# Patient Record
Sex: MALE | Race: WHITE | NOT HISPANIC OR LATINO | URBAN - METROPOLITAN AREA
[De-identification: names, ages, dates, MRNs, and addresses within clinical notes are randomized per-mention and may not be internally consistent; named-entity substitution may affect disease eponyms.]

---

## 2019-02-24 ENCOUNTER — EMERGENCY (EMERGENCY)
Facility: HOSPITAL | Age: 64
LOS: 1 days | Discharge: HOME | End: 2019-02-24
Admitting: EMERGENCY MEDICINE
Payer: COMMERCIAL

## 2019-02-24 VITALS
HEART RATE: 62 BPM | SYSTOLIC BLOOD PRESSURE: 145 MMHG | TEMPERATURE: 99 F | DIASTOLIC BLOOD PRESSURE: 72 MMHG | OXYGEN SATURATION: 96 % | RESPIRATION RATE: 18 BRPM

## 2019-02-24 VITALS
HEART RATE: 66 BPM | SYSTOLIC BLOOD PRESSURE: 137 MMHG | TEMPERATURE: 99 F | OXYGEN SATURATION: 97 % | DIASTOLIC BLOOD PRESSURE: 71 MMHG | RESPIRATION RATE: 17 BRPM

## 2019-02-24 DIAGNOSIS — R50.9 FEVER, UNSPECIFIED: ICD-10-CM

## 2019-02-24 DIAGNOSIS — B20 HUMAN IMMUNODEFICIENCY VIRUS [HIV] DISEASE: ICD-10-CM

## 2019-02-24 DIAGNOSIS — R05 COUGH: ICD-10-CM

## 2019-02-24 DIAGNOSIS — Z88.2 ALLERGY STATUS TO SULFONAMIDES: ICD-10-CM

## 2019-02-24 PROCEDURE — 71046 X-RAY EXAM CHEST 2 VIEWS: CPT | Mod: 26

## 2019-02-24 PROCEDURE — 99283 EMERGENCY DEPT VISIT LOW MDM: CPT | Mod: 25

## 2019-02-24 RX ORDER — AZITHROMYCIN 500 MG/1
500 TABLET, FILM COATED ORAL ONCE
Qty: 0 | Refills: 0 | Status: COMPLETED | OUTPATIENT
Start: 2019-02-24 | End: 2019-02-24

## 2019-02-24 RX ORDER — AZITHROMYCIN 500 MG/1
1 TABLET, FILM COATED ORAL
Qty: 6 | Refills: 0
Start: 2019-02-24 | End: 2019-02-28

## 2019-02-24 RX ADMIN — Medication 200 MILLIGRAM(S): at 18:24

## 2019-02-24 RX ADMIN — AZITHROMYCIN 500 MILLIGRAM(S): 500 TABLET, FILM COATED ORAL at 18:23

## 2019-02-24 NOTE — ED ADULT NURSE NOTE - NSIMPLEMENTINTERV_GEN_ALL_ED
Implemented All Universal Safety Interventions:  [place] to call system. Call bell, personal items and telephone within reach. Instruct patient to call for assistance. Room bathroom lighting operational. Non-slip footwear when patient is off stretcher. Physically safe environment: no spills, clutter or unnecessary equipment. Stretcher in lowest position, wheels locked, appropriate side rails in place.

## 2019-02-24 NOTE — ED ADULT TRIAGE NOTE - CHIEF COMPLAINT QUOTE
pt c/o pt c/o productive cough. states it started Tuesday after he got home from a cruise vacation. pt is hiv positive

## 2019-02-24 NOTE — ED PROVIDER NOTE - DIAGNOSTIC INTERPRETATION
Interpreted by ERIBERTO Cespedes   Chest x-ray, 2 views  Lungs clear, heart shadow normal, bony structures normal, no free air under diaphragm, no PTX

## 2019-02-24 NOTE — ED PROVIDER NOTE - CLINICAL SUMMARY MEDICAL DECISION MAKING FREE TEXT BOX
5 y/o M with presents to the ED c/o a productive cough x 6 days. Bronchitis vs early PNA, Zpak, cough suppressant and PMD f/u. 63 y/o M with presents to the ED c/o a productive cough x 6 days. Bronchitis vs early PNA, Zpak, cough suppressant and PMD f/u.

## 2019-02-24 NOTE — ED ADULT NURSE NOTE - CHIEF COMPLAINT QUOTE
pt c/o productive cough. states it started Tuesday after he got home from a cruise vacation. pt is hiv positive

## 2019-02-24 NOTE — ED PROVIDER NOTE - OBJECTIVE STATEMENT
63 y/o M with a PMHx of HIV (CD4 above 500) presents to the ED c/o a productive cough x 6 days. Pt states that he returned from a cruise a week ago and shortly experienced sx of a persistent productive cough. Pt is also noted to have a low grade fever while in the ED. Pt otherwise denies any chills, HA, dizziness, abdominal pain, N/V/D, CP and SOB.

## 2021-06-03 VITALS
TEMPERATURE: 99 F | RESPIRATION RATE: 18 BRPM | HEIGHT: 68 IN | SYSTOLIC BLOOD PRESSURE: 167 MMHG | WEIGHT: 149.91 LBS | DIASTOLIC BLOOD PRESSURE: 89 MMHG | OXYGEN SATURATION: 97 % | HEART RATE: 65 BPM

## 2021-06-03 LAB
ALBUMIN SERPL ELPH-MCNC: 3.9 G/DL — SIGNIFICANT CHANGE UP (ref 3.4–5)
ALP SERPL-CCNC: 106 U/L — SIGNIFICANT CHANGE UP (ref 40–120)
ALT FLD-CCNC: 35 U/L — SIGNIFICANT CHANGE UP (ref 12–42)
ANION GAP SERPL CALC-SCNC: 10 MMOL/L — SIGNIFICANT CHANGE UP (ref 9–16)
APTT BLD: 30.4 SEC — SIGNIFICANT CHANGE UP (ref 27.5–35.5)
AST SERPL-CCNC: 19 U/L — SIGNIFICANT CHANGE UP (ref 15–37)
BASOPHILS # BLD AUTO: 0.02 K/UL — SIGNIFICANT CHANGE UP (ref 0–0.2)
BASOPHILS NFR BLD AUTO: 0.3 % — SIGNIFICANT CHANGE UP (ref 0–2)
BILIRUB SERPL-MCNC: 0.5 MG/DL — SIGNIFICANT CHANGE UP (ref 0.2–1.2)
BUN SERPL-MCNC: 14 MG/DL — SIGNIFICANT CHANGE UP (ref 7–23)
CALCIUM SERPL-MCNC: 9.4 MG/DL — SIGNIFICANT CHANGE UP (ref 8.5–10.5)
CHLORIDE SERPL-SCNC: 101 MMOL/L — SIGNIFICANT CHANGE UP (ref 96–108)
CO2 SERPL-SCNC: 30 MMOL/L — SIGNIFICANT CHANGE UP (ref 22–31)
CREAT SERPL-MCNC: 1.09 MG/DL — SIGNIFICANT CHANGE UP (ref 0.5–1.3)
EOSINOPHIL # BLD AUTO: 0.11 K/UL — SIGNIFICANT CHANGE UP (ref 0–0.5)
EOSINOPHIL NFR BLD AUTO: 1.4 % — SIGNIFICANT CHANGE UP (ref 0–6)
ETHANOL SERPL-MCNC: <3 MG/DL — SIGNIFICANT CHANGE UP
GLUCOSE SERPL-MCNC: 176 MG/DL — HIGH (ref 70–99)
HCT VFR BLD CALC: 44.3 % — SIGNIFICANT CHANGE UP (ref 39–50)
HGB BLD-MCNC: 15 G/DL — SIGNIFICANT CHANGE UP (ref 13–17)
IMM GRANULOCYTES NFR BLD AUTO: 0.3 % — SIGNIFICANT CHANGE UP (ref 0–1.5)
INR BLD: 1.07 — SIGNIFICANT CHANGE UP (ref 0.88–1.16)
LACTATE SERPL-SCNC: 1.6 MMOL/L — SIGNIFICANT CHANGE UP (ref 0.4–2)
LIDOCAIN IGE QN: 84 U/L — SIGNIFICANT CHANGE UP (ref 73–393)
LYMPHOCYTES # BLD AUTO: 2.36 K/UL — SIGNIFICANT CHANGE UP (ref 1–3.3)
LYMPHOCYTES # BLD AUTO: 29.8 % — SIGNIFICANT CHANGE UP (ref 13–44)
MAGNESIUM SERPL-MCNC: 2 MG/DL — SIGNIFICANT CHANGE UP (ref 1.6–2.6)
MCHC RBC-ENTMCNC: 31.4 PG — SIGNIFICANT CHANGE UP (ref 27–34)
MCHC RBC-ENTMCNC: 33.9 GM/DL — SIGNIFICANT CHANGE UP (ref 32–36)
MCV RBC AUTO: 92.9 FL — SIGNIFICANT CHANGE UP (ref 80–100)
MONOCYTES # BLD AUTO: 0.63 K/UL — SIGNIFICANT CHANGE UP (ref 0–0.9)
MONOCYTES NFR BLD AUTO: 8 % — SIGNIFICANT CHANGE UP (ref 2–14)
NEUTROPHILS # BLD AUTO: 4.77 K/UL — SIGNIFICANT CHANGE UP (ref 1.8–7.4)
NEUTROPHILS NFR BLD AUTO: 60.2 % — SIGNIFICANT CHANGE UP (ref 43–77)
NRBC # BLD: 0 /100 WBCS — SIGNIFICANT CHANGE UP (ref 0–0)
NT-PROBNP SERPL-SCNC: 38 PG/ML — SIGNIFICANT CHANGE UP
PLATELET # BLD AUTO: 239 K/UL — SIGNIFICANT CHANGE UP (ref 150–400)
POTASSIUM SERPL-MCNC: 4.1 MMOL/L — SIGNIFICANT CHANGE UP (ref 3.5–5.3)
POTASSIUM SERPL-SCNC: 4.1 MMOL/L — SIGNIFICANT CHANGE UP (ref 3.5–5.3)
PROT SERPL-MCNC: 8 G/DL — SIGNIFICANT CHANGE UP (ref 6.4–8.2)
PROTHROM AB SERPL-ACNC: 12.6 SEC — SIGNIFICANT CHANGE UP (ref 10.6–13.6)
RBC # BLD: 4.77 M/UL — SIGNIFICANT CHANGE UP (ref 4.2–5.8)
RBC # FLD: 12.8 % — SIGNIFICANT CHANGE UP (ref 10.3–14.5)
SARS-COV-2 RNA SPEC QL NAA+PROBE: SIGNIFICANT CHANGE UP
SODIUM SERPL-SCNC: 141 MMOL/L — SIGNIFICANT CHANGE UP (ref 132–145)
TROPONIN I SERPL-MCNC: <0.017 NG/ML — LOW (ref 0.02–0.06)
WBC # BLD: 7.91 K/UL — SIGNIFICANT CHANGE UP (ref 3.8–10.5)
WBC # FLD AUTO: 7.91 K/UL — SIGNIFICANT CHANGE UP (ref 3.8–10.5)

## 2021-06-03 PROCEDURE — 93010 ELECTROCARDIOGRAM REPORT: CPT | Mod: NC

## 2021-06-03 PROCEDURE — 99285 EMERGENCY DEPT VISIT HI MDM: CPT

## 2021-06-03 NOTE — ED ADULT TRIAGE NOTE - CHIEF COMPLAINT QUOTE
Walk in c/o upper abdominal pain and bloating since this am. Tender to touch. Takes omeprazole daily /gerd.  denies n/v. States he is feeling fatigued and he did not have a BM today which is unusual for him.  PMH heart attack in 2000, 3 cardiac stents. Vocal cord ca. HIV undetectable

## 2021-06-03 NOTE — ED ADULT NURSE NOTE - OBJECTIVE STATEMENT
65 yo M c/o abd pain. Pt reports he feels "pain just above his belly button that feels like a building pressure. It periodically pops and then subsides, but then the pressure begins building again." Subjective fever at home. Last BM yesterday, denies blood in stool. Denies CP, SOB, N/V/D, headache, dizziness, fever/chills, numbness/tingling, change in bowel or bladder habits. Pt speaking in full complete sentences, ambulatory with steady gait.

## 2021-06-03 NOTE — ED ADULT NURSE NOTE - NS ED PATIENT SAFETY CONCERN
Assessment/Plan:  1  Primary osteoarthritis of left knee  Large joint arthrocentesis   2  Chronic pain of left knee  Large joint arthrocentesis     Edmund Byrnes is a pleasant 77-year-old female with activity related left knee pain due to her severe underlying osteoarthritis  She consented to and underwent the 3rd and final Euflexxa injection today without difficulty or complication as detailed below  The post injection instructions were provided  We will plan to see her in 6 months for reevaluation and consideration of repeat injections if needed  All questions addressed  Large joint arthrocentesis  Date/Time: 1/25/2019 3:08 PM  Consent given by: patient  Site marked: site marked  Timeout: Immediately prior to procedure a time out was called to verify the correct patient, procedure, equipment, support staff and site/side marked as required   Supporting Documentation  Indications: pain   Procedure Details  Location: knee - L knee  Preparation: Patient was prepped and draped in the usual sterile fashion  Needle size: 20 G  Ultrasound guidance: no  Approach: anterolateral  Medications administered: 20 mg Sodium Hyaluronate 20 MG/2ML    Patient tolerance: patient tolerated the procedure well with no immediate complications  Dressing:  Sterile dressing applied          Subjective: Left knee Euflexxa #3    Patient ID: Roseann Mckinley is a 78 y o  female  Edmund Byrnes is a very pleasant 78year old female presenting for the third and final Euflexxa injection for her activity related left knee pain and severe osteoarthritis  She reports having some benefit from the first two injections  She denies adverse effects or new injuries  Review of Systems   Constitutional: Negative  HENT: Negative  Eyes: Negative  Respiratory: Negative  Cardiovascular: Negative  Gastrointestinal: Negative  Endocrine: Negative  Genitourinary: Negative  Musculoskeletal: Positive for arthralgias  Skin: Negative  Allergic/Immunologic: Negative  Neurological: Negative  Hematological: Negative  Psychiatric/Behavioral: Negative  Past Medical History:   Diagnosis Date    Cancer ([facility] )     COPD (chronic obstructive pulmonary disease) (HCC)     moderate  FEV! - 1 21 liters or 68% of predicted    Disease of thyroid gland     Hyperlipidemia     Hypertension     [facility] 08/21/2012    Had left VATS with wedge resection left upper lobe lung cancer - moderately differentiated adenocarcinoma stage IA       Past Surgical History:   Procedure Laterality Date    APPENDECTOMY      BACK SURGERY      L4-S1 laminectomy    EYE SURGERY      HYSTERECTOMY      LUNG SURGERY Left 08/21/2012    Left VATS with wedge resection of a stage I a 2 5 cm non-small cell lung carcinoma    PYELOPLASTY         Family History   Problem Relation Age of Onset    Esophageal cancer Brother     No Known Problems Mother     No Known Problems Father     No Known Problems Sister     No Known Problems Maternal Aunt     No Known Problems Maternal Uncle     No Known Problems Paternal Aunt     No Known Problems Paternal Uncle     No Known Problems Maternal Grandmother     No Known Problems Maternal Grandfather     No Known Problems Paternal Grandmother     No Known Problems Paternal Grandfather     ADD / ADHD Neg Hx     Anesthesia problems Neg Hx     Cancer Neg Hx     Clotting disorder Neg Hx     Collagen disease Neg Hx     Diabetes Neg Hx     Dislocations Neg Hx     Learning disabilities Neg Hx     Neurological problems Neg Hx     Osteoporosis Neg Hx     Rheumatologic disease Neg Hx     Scoliosis Neg Hx     Vascular Disease Neg Hx        Social History     Occupational History    Not on file       Social History Main Topics    Smoking status: Former Smoker     Packs/day: 1 50     Years: 35 00     Types: Cigarettes     Quit date: 1990    Smokeless tobacco: Never Used    Alcohol use No Comment: socially    Drug use: No    Sexual activity: Not on file         Current Outpatient Prescriptions:     Albuterol Sulfate (PROAIR RESPICLICK) 316 (90 Base) MCG/ACT AEPB, Inhale 2 puffs every 4 (four) hours as needed (SOB), Disp: 1 each, Rfl: 5    amLODIPine (NORVASC) 5 mg tablet, Take by mouth, Disp: , Rfl:     atorvastatin (LIPITOR) 20 mg tablet, Take 20 mg by mouth daily, Disp: , Rfl:     Cholecalciferol (VITAMIN D3) 1000 UNITS CAPS, Take by mouth daily, Disp: , Rfl:     clotrimazole-betamethasone (LOTRISONE) 1-0 05 % cream, Apply topically Twice daily, Disp: , Rfl:     furosemide (LASIX) 20 mg tablet, Take 20 mg by mouth daily As needed , Disp: , Rfl:     levothyroxine 25 mcg tablet, Take 25 mcg by mouth daily, Disp: , Rfl:     losartan (COZAAR) 100 MG tablet, , Disp: , Rfl:     losartan-hydrochlorothiazide (HYZAAR) 100-12 5 MG per tablet, Take 1 tablet by mouth daily, Disp: , Rfl:     Magnesium 250 MG TABS, Take by mouth, Disp: , Rfl:     magnesium gluconate (MAGONATE) 500 mg tablet, Take 250 mg by mouth daily, Disp: , Rfl:     meloxicam (MOBIC) 15 mg tablet, , Disp: , Rfl:     metoprolol tartrate (LOPRESSOR) 25 mg tablet, Take 25 mg by mouth 2 (two) times a day, Disp: , Rfl:     multivitamin-iron-minerals-folic acid (CENTRUM) chewable tablet, Chew 1 tablet daily, Disp: , Rfl:     nortriptyline (PAMELOR) 10 mg capsule, Take 10 mg by mouth 2 (two) times a day, Disp: , Rfl:     potassium chloride (K-DUR) 10 mEq tablet, , Disp: , Rfl:     potassium chloride (K-DUR,KLOR-CON) 10 mEq tablet, Take 10 mEq by mouth daily, Disp: , Rfl:     Umeclidinium-Vilanterol (ANORO ELLIPTA) 62 5-25 MCG/INH AEPB, Inhale 1 puff daily, Disp: , Rfl:     Vitamin D, Cholecalciferol, 1000 units CAPS, Take by mouth, Disp: , Rfl:     Allergies   Allergen Reactions    Latex Rash    Oxycodone-Acetaminophen Confusion     "loopy"    Percolone [Oxycodone] Other (See Comments)     States it makes her crazy    Tetanus Antitoxin Confusion and Edema    Tetanus Toxoids Swelling    Wound Dressings Rash       Objective:  Vitals:    01/25/19 1505   BP: 146/74   Pulse: 88       Body mass index is 35 9 kg/m²  Left Knee Exam     Tenderness   The patient is experiencing tenderness in the MCL, medial joint line, patella and medial retinaculum (medial and lateral patellar facets )  Range of Motion   Extension: 0   Flexion: 110     Muscle Strength     The patient has normal left knee strength  Tests   Diana:  Medial - negative Lateral - negative  Lachman:  Anterior - negative      Drawer:       Anterior - negative     Posterior - negative  Varus: negative  Valgus: negative  Patellar Apprehension: negative    Other   Erythema: absent  Scars: absent  Sensation: normal  Pulse: present  Swelling: none  Effusion: no effusion present    Comments:  Positive patellofemoral crepitus and grind   Ambulates with slightly antalgic gait on the left          Observations   Left Knee   Negative for effusion  Physical Exam   Constitutional: She is oriented to person, place, and time  She appears well-developed and well-nourished  Body mass index is 35 9 kg/m²  HENT:   Head: Normocephalic and atraumatic  Eyes: EOM are normal    Neck: Normal range of motion  Cardiovascular: Intact distal pulses  Pulmonary/Chest: Effort normal    Musculoskeletal:        Left knee: She exhibits no effusion  See ortho exam   Neurological: She is alert and oriented to person, place, and time  Skin: Skin is warm and dry  Psychiatric: She has a normal mood and affect   Her behavior is normal  Judgment and thought content normal  No

## 2021-06-04 ENCOUNTER — INPATIENT (INPATIENT)
Facility: HOSPITAL | Age: 66
LOS: 5 days | Discharge: HOME | DRG: 389 | End: 2021-06-10
Attending: SURGERY | Admitting: SURGERY
Payer: COMMERCIAL

## 2021-06-04 PROBLEM — B20 HUMAN IMMUNODEFICIENCY VIRUS [HIV] DISEASE: Chronic | Status: ACTIVE | Noted: 2019-02-24

## 2021-06-04 LAB
A1C WITH ESTIMATED AVERAGE GLUCOSE RESULT: 7.3 % — HIGH (ref 4–5.6)
ANION GAP SERPL CALC-SCNC: 14 MMOL/L — SIGNIFICANT CHANGE UP (ref 5–17)
APPEARANCE UR: CLEAR — SIGNIFICANT CHANGE UP
BILIRUB UR-MCNC: NEGATIVE — SIGNIFICANT CHANGE UP
BLD GP AB SCN SERPL QL: NEGATIVE — SIGNIFICANT CHANGE UP
BUN SERPL-MCNC: 10 MG/DL — SIGNIFICANT CHANGE UP (ref 7–23)
CALCIUM SERPL-MCNC: 9.5 MG/DL — SIGNIFICANT CHANGE UP (ref 8.4–10.5)
CHLORIDE SERPL-SCNC: 98 MMOL/L — SIGNIFICANT CHANGE UP (ref 96–108)
CO2 SERPL-SCNC: 25 MMOL/L — SIGNIFICANT CHANGE UP (ref 22–31)
COLOR SPEC: YELLOW — SIGNIFICANT CHANGE UP
COMMENT - URINE: SIGNIFICANT CHANGE UP
CREAT SERPL-MCNC: 0.94 MG/DL — SIGNIFICANT CHANGE UP (ref 0.5–1.3)
DIFF PNL FLD: NEGATIVE — SIGNIFICANT CHANGE UP
ESTIMATED AVERAGE GLUCOSE: 163 MG/DL — HIGH (ref 68–114)
GLUCOSE BLDC GLUCOMTR-MCNC: 137 MG/DL — HIGH (ref 70–99)
GLUCOSE BLDC GLUCOMTR-MCNC: 140 MG/DL — HIGH (ref 70–99)
GLUCOSE BLDC GLUCOMTR-MCNC: 141 MG/DL — HIGH (ref 70–99)
GLUCOSE SERPL-MCNC: 167 MG/DL — HIGH (ref 70–99)
GLUCOSE UR QL: NEGATIVE — SIGNIFICANT CHANGE UP
GRAN CASTS # UR COMP ASSIST: ABNORMAL /LPF
HCT VFR BLD CALC: 44.9 % — SIGNIFICANT CHANGE UP (ref 39–50)
HCV AB S/CO SERPL IA: 0.03 S/CO — SIGNIFICANT CHANGE UP
HCV AB SERPL-IMP: SIGNIFICANT CHANGE UP
HGB BLD-MCNC: 15.7 G/DL — SIGNIFICANT CHANGE UP (ref 13–17)
KETONES UR-MCNC: NEGATIVE — SIGNIFICANT CHANGE UP
LEUKOCYTE ESTERASE UR-ACNC: NEGATIVE — SIGNIFICANT CHANGE UP
MAGNESIUM SERPL-MCNC: 1.9 MG/DL — SIGNIFICANT CHANGE UP (ref 1.6–2.6)
MCHC RBC-ENTMCNC: 32.2 PG — SIGNIFICANT CHANGE UP (ref 27–34)
MCHC RBC-ENTMCNC: 35 GM/DL — SIGNIFICANT CHANGE UP (ref 32–36)
MCV RBC AUTO: 92 FL — SIGNIFICANT CHANGE UP (ref 80–100)
NITRITE UR-MCNC: NEGATIVE — SIGNIFICANT CHANGE UP
NRBC # BLD: 0 /100 WBCS — SIGNIFICANT CHANGE UP (ref 0–0)
PH UR: 7.5 — SIGNIFICANT CHANGE UP (ref 5–8)
PHOSPHATE SERPL-MCNC: 2.7 MG/DL — SIGNIFICANT CHANGE UP (ref 2.5–4.5)
PLATELET # BLD AUTO: 241 K/UL — SIGNIFICANT CHANGE UP (ref 150–400)
POTASSIUM SERPL-MCNC: 4 MMOL/L — SIGNIFICANT CHANGE UP (ref 3.5–5.3)
POTASSIUM SERPL-SCNC: 4 MMOL/L — SIGNIFICANT CHANGE UP (ref 3.5–5.3)
PROT UR-MCNC: ABNORMAL MG/DL
RBC # BLD: 4.88 M/UL — SIGNIFICANT CHANGE UP (ref 4.2–5.8)
RBC # FLD: 12.9 % — SIGNIFICANT CHANGE UP (ref 10.3–14.5)
RH IG SCN BLD-IMP: POSITIVE — SIGNIFICANT CHANGE UP
SODIUM SERPL-SCNC: 137 MMOL/L — SIGNIFICANT CHANGE UP (ref 135–145)
SP GR SPEC: 1.02 — SIGNIFICANT CHANGE UP (ref 1–1.03)
UROBILINOGEN FLD QL: 1 E.U./DL — SIGNIFICANT CHANGE UP
WBC # BLD: 7.9 K/UL — SIGNIFICANT CHANGE UP (ref 3.8–10.5)
WBC # FLD AUTO: 7.9 K/UL — SIGNIFICANT CHANGE UP (ref 3.8–10.5)
WBC UR QL: ABNORMAL /HPF

## 2021-06-04 PROCEDURE — 74177 CT ABD & PELVIS W/CONTRAST: CPT | Mod: 26

## 2021-06-04 PROCEDURE — 99255 IP/OBS CONSLTJ NEW/EST HI 80: CPT

## 2021-06-04 PROCEDURE — 71045 X-RAY EXAM CHEST 1 VIEW: CPT | Mod: 26

## 2021-06-04 RX ORDER — MAGNESIUM SULFATE 500 MG/ML
1 VIAL (ML) INJECTION ONCE
Refills: 0 | Status: COMPLETED | OUTPATIENT
Start: 2021-06-04 | End: 2021-06-04

## 2021-06-04 RX ORDER — GLUCAGON INJECTION, SOLUTION 0.5 MG/.1ML
1 INJECTION, SOLUTION SUBCUTANEOUS ONCE
Refills: 0 | Status: DISCONTINUED | OUTPATIENT
Start: 2021-06-04 | End: 2021-06-10

## 2021-06-04 RX ORDER — METOPROLOL TARTRATE 50 MG
5 TABLET ORAL EVERY 6 HOURS
Refills: 0 | Status: DISCONTINUED | OUTPATIENT
Start: 2021-06-04 | End: 2021-06-08

## 2021-06-04 RX ORDER — SODIUM CHLORIDE 9 MG/ML
1000 INJECTION, SOLUTION INTRAVENOUS
Refills: 0 | Status: DISCONTINUED | OUTPATIENT
Start: 2021-06-04 | End: 2021-06-10

## 2021-06-04 RX ORDER — DEXTROSE 50 % IN WATER 50 %
25 SYRINGE (ML) INTRAVENOUS ONCE
Refills: 0 | Status: DISCONTINUED | OUTPATIENT
Start: 2021-06-04 | End: 2021-06-10

## 2021-06-04 RX ORDER — LEVOTHYROXINE SODIUM 125 MCG
40 TABLET ORAL
Refills: 0 | Status: DISCONTINUED | OUTPATIENT
Start: 2021-06-04 | End: 2021-06-08

## 2021-06-04 RX ORDER — LABETALOL HCL 100 MG
10 TABLET ORAL ONCE
Refills: 0 | Status: COMPLETED | OUTPATIENT
Start: 2021-06-04 | End: 2021-06-04

## 2021-06-04 RX ORDER — DEXTROSE 50 % IN WATER 50 %
12.5 SYRINGE (ML) INTRAVENOUS ONCE
Refills: 0 | Status: DISCONTINUED | OUTPATIENT
Start: 2021-06-04 | End: 2021-06-10

## 2021-06-04 RX ORDER — INSULIN LISPRO 100/ML
VIAL (ML) SUBCUTANEOUS
Refills: 0 | Status: DISCONTINUED | OUTPATIENT
Start: 2021-06-04 | End: 2021-06-10

## 2021-06-04 RX ORDER — ACETAMINOPHEN 500 MG
1000 TABLET ORAL ONCE
Refills: 0 | Status: COMPLETED | OUTPATIENT
Start: 2021-06-04 | End: 2021-06-04

## 2021-06-04 RX ORDER — VALACYCLOVIR 500 MG/1
500 TABLET, FILM COATED ORAL DAILY
Refills: 0 | Status: DISCONTINUED | OUTPATIENT
Start: 2021-06-04 | End: 2021-06-10

## 2021-06-04 RX ORDER — SODIUM CHLORIDE 9 MG/ML
1000 INJECTION, SOLUTION INTRAVENOUS
Refills: 0 | Status: DISCONTINUED | OUTPATIENT
Start: 2021-06-04 | End: 2021-06-07

## 2021-06-04 RX ORDER — DEXTROSE 50 % IN WATER 50 %
15 SYRINGE (ML) INTRAVENOUS ONCE
Refills: 0 | Status: DISCONTINUED | OUTPATIENT
Start: 2021-06-04 | End: 2021-06-10

## 2021-06-04 RX ORDER — PANTOPRAZOLE SODIUM 20 MG/1
40 TABLET, DELAYED RELEASE ORAL EVERY 24 HOURS
Refills: 0 | Status: DISCONTINUED | OUTPATIENT
Start: 2021-06-04 | End: 2021-06-05

## 2021-06-04 RX ORDER — DIPHENHYDRAMINE HCL 50 MG
25 CAPSULE ORAL ONCE
Refills: 0 | Status: COMPLETED | OUTPATIENT
Start: 2021-06-04 | End: 2021-06-04

## 2021-06-04 RX ORDER — HEPARIN SODIUM 5000 [USP'U]/ML
5000 INJECTION INTRAVENOUS; SUBCUTANEOUS EVERY 8 HOURS
Refills: 0 | Status: DISCONTINUED | OUTPATIENT
Start: 2021-06-04 | End: 2021-06-10

## 2021-06-04 RX ORDER — ONDANSETRON 8 MG/1
4 TABLET, FILM COATED ORAL ONCE
Refills: 0 | Status: DISCONTINUED | OUTPATIENT
Start: 2021-06-04 | End: 2021-06-10

## 2021-06-04 RX ORDER — BENZOCAINE AND MENTHOL 5; 1 G/100ML; G/100ML
1 LIQUID ORAL THREE TIMES A DAY
Refills: 0 | Status: DISCONTINUED | OUTPATIENT
Start: 2021-06-04 | End: 2021-06-10

## 2021-06-04 RX ORDER — BENZOCAINE AND MENTHOL 5; 1 G/100ML; G/100ML
1 LIQUID ORAL THREE TIMES A DAY
Refills: 0 | Status: DISCONTINUED | OUTPATIENT
Start: 2021-06-04 | End: 2021-06-04

## 2021-06-04 RX ADMIN — HEPARIN SODIUM 5000 UNIT(S): 5000 INJECTION INTRAVENOUS; SUBCUTANEOUS at 21:02

## 2021-06-04 RX ADMIN — BENZOCAINE AND MENTHOL 1 LOZENGE: 5; 1 LIQUID ORAL at 13:16

## 2021-06-04 RX ADMIN — Medication 100 GRAM(S): at 10:05

## 2021-06-04 RX ADMIN — Medication 25 MILLIGRAM(S): at 22:09

## 2021-06-04 RX ADMIN — Medication 5 MILLIGRAM(S): at 23:29

## 2021-06-04 RX ADMIN — SODIUM CHLORIDE 100 MILLILITER(S): 9 INJECTION, SOLUTION INTRAVENOUS at 04:29

## 2021-06-04 RX ADMIN — Medication 400 MILLIGRAM(S): at 22:08

## 2021-06-04 RX ADMIN — Medication 400 MILLIGRAM(S): at 13:15

## 2021-06-04 RX ADMIN — HEPARIN SODIUM 5000 UNIT(S): 5000 INJECTION INTRAVENOUS; SUBCUTANEOUS at 13:16

## 2021-06-04 RX ADMIN — Medication 5 MILLIGRAM(S): at 17:53

## 2021-06-04 RX ADMIN — Medication 400 MILLIGRAM(S): at 07:29

## 2021-06-04 RX ADMIN — Medication 1000 MILLIGRAM(S): at 13:45

## 2021-06-04 RX ADMIN — HEPARIN SODIUM 5000 UNIT(S): 5000 INJECTION INTRAVENOUS; SUBCUTANEOUS at 05:12

## 2021-06-04 RX ADMIN — Medication 1000 MILLIGRAM(S): at 22:24

## 2021-06-04 RX ADMIN — PANTOPRAZOLE SODIUM 40 MILLIGRAM(S): 20 TABLET, DELAYED RELEASE ORAL at 05:12

## 2021-06-04 RX ADMIN — BENZOCAINE AND MENTHOL 1 LOZENGE: 5; 1 LIQUID ORAL at 21:02

## 2021-06-04 NOTE — ED PROVIDER NOTE - ATTENDING CONTRIBUTION TO CARE
Patient presents with diffuse abd pain, ct c.w with sbo and ileus, will require admission. agree with documentation and management.

## 2021-06-04 NOTE — CHART NOTE - NSCHARTNOTEFT_GEN_A_CORE
NGT placement     NGT placed in left nostril without resistance, bleeding or any issues. Well tolerated by the patient    CXR confirm the placement     NGT connected to LWIS and fixed with tape NGT placement     NGT placed in left nostril without resistance, bleeding or any issues. Well tolerated by the patient. Immediate output of 200cc yellow thick fluid.    CXR confirm the placement     NGT connected to LWIS and fixed with tape

## 2021-06-04 NOTE — CONSULT NOTE ADULT - TIME BILLING
Time spent discussing care with primary team. Greater than 60 minutes spent on total encounter; more than 50% of the visit was spent counseling and/or coordinating care by the attending physician.

## 2021-06-04 NOTE — ED PROVIDER NOTE - NS_EDPROVIDERDISPOUSERTYPE_ED_A_ED
Attending Attestation (For Attendings USE Only)... Hatchet Flap Text: The defect edges were debeveled with a #15c scalpel blade.  Given the location of the defect, shape of the defect and the proximity to free margins a hatchet flap was deemed most appropriate.  Using a sterile surgical marker, an appropriate hatchet flap was drawn incorporating the defect and placing the expected incisions within the relaxed skin tension lines where possible.    The area thus outlined was incised deep to adipose tissue with a #15 scalpel blade.  The skin margins were undermined to an appropriate distance in all directions utilizing iris scissors.

## 2021-06-04 NOTE — H&P ADULT - ASSESSMENT
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 Presenting with abdominal pain and nausea. Last BF 2 days ago, CT with evidence of SBO.   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 Presenting with abdominal pain and nausea. Last BF 2 days ago, CT with evidence of SBO.   Patient now is Afebrile, HDS, no N/V, NGT, no BF  Plan:  1- NGT placement followed by CXR to confirm placement   2- NPO with IV fluids  3- Nausea and pain control, while avoiding narcotics   4- Hospitalist consult for optimization in case of surgical treatment indicated   5- Strict I&O  6- Monitor bowel function  7- OOBA/SCD/SQH  8- Serial abdominal exam q6  9- Admitted to team 4 surgery   10- PCP for further collateral and medical history

## 2021-06-04 NOTE — H&P ADULT - NSICDXPASTMEDICALHX_GEN_ALL_CORE_FT
PAST MEDICAL HISTORY:  Chronic GERD     DM (diabetes mellitus)     Heart attack     History of anal cancer     History of herpes zoster with AIDS     HLD (hyperlipidemia)     HTN (hypertension)     Human immunodeficiency virus (HIV) infection     S/P radiotherapy     Vocal cord cancer

## 2021-06-04 NOTE — PROGRESS NOTE ADULT - ASSESSMENT
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 Presenting with abdominal pain and nausea. Last BF 2 days ago, CT with evidence of SBO.     NPO/IVF  NGT to LIWS  Pain/nausea control (avoid narcotics)  OOBA/SCD/IS/SQH  KEHINDE  AM labs

## 2021-06-04 NOTE — ED PROVIDER NOTE - CPE EDP ENMT NORM
SCAR CARE        The formation and maturation of scars is a complex and long process. There are various stages a scar goes through and ultimately the entire process of healing takes about 1 year.        SCAR FEATURES:     1. During the first week, the scar area will become red and feel stiff and firm. Sometimes a wound produces yellow mucous in the first few days or in an area on the incision which is irritated. This is an exudate, not pus and will slowly improve as the wound heals. If this exudate dried on the wound surface, it would form a scab.     2. With time, lumps may be noticeable around the area (these usually flatten out).     3. Scars and the area around them are often numb and should regain all or most of the feeling with time. Sensation will return slowly over weeks to months.     It can take up to 1 year for a scar to mature, fade, and flatten.         CREATING THE IDEAL SCAR:     1. Many scar creams are available which may or may not be helpful.      2. Most scar creams or patches help to hydrate the surface of the scar and prevent thickening.     3. Massaging along the scar once the wound has healed is typically the most important treatment for softening and improving the appearance of the scar. Massage 5 times a day for 5 minutes at a time.     4. Massaging with or without scar creams is most beneficial in the first 2-3 months and then may be stopped.      5. Sunscreen is very important! Apply sunscreen to a new scar soon after the sutures are out or dissolved (unless directed otherwise). Wear it daily and reapply as needed during the first year after the scar was formed. Sunscreen or avoiding the sun is recommended to prevent darkening of your scar.            6. If desired, you may use an over the counter scar cream, lotion, or gel after the wound is completely healed and the Vaseline ointment has been stopped.      7. If using a scar cream that does not contain sunscreen, apply the sunscreen to  the wound first, then scar cream shortly thereafter.         SCAR PRODUCTS:     1. Products with silicone or dimethicone try to prevent thickening of a scar. They come in a gel, cream, or patch and may be used with some benefit.     2. Mederma is a scar product that uses onion extract for its active ingredient. It may include sunscreen, which is convenient.      3. Most Products can be used 1-2 weeks after creation of new wound and for 3-4 months maximally or until benefit is no longer noticed.         SEVERAL POPULAR OVER THE COUNTER SCAR PRODUCTS:     Mederma   AcuScar™   Bio-Oil  Scarguard SG5 Technology Scar Treatment  ScarAway Silicone Scar Sheets  [employer] Scar Cream  Scar Zone  ScarEase  Kelocote     normal...

## 2021-06-04 NOTE — PROGRESS NOTE ADULT - SUBJECTIVE AND OBJECTIVE BOX
24 hr events:    SUBJECTIVE:  Pt seen and examined by chief resident. Pt is doing well, resting comfortably on bed. Pain controlled. -F/-BM. No nausea or vomiting.     Vital Signs Last 24 Hrs  T(C): 36.9 (2021 05:00), Max: 37.3 (2021 21:51)  T(F): 98.5 (2021 05:00), Max: 99.1 (2021 21:51)  HR: 62 (2021 05:00) (62 - 77)  BP: 168/87 (2021 05:00) (160/86 - 168/87)  BP(mean): 114 (2021 05:00) (111 - 114)  RR: 18 (2021 05:00) (17 - 18)  SpO2: 96% (2021 05:00) (96% - 98%)    Physical Exam:  General: NAD  Pulmonary: Nonlabored breathing, no respiratory distress  Abdominal: soft, distended, tender in the epigastrium. no rebound or guarding.   Extremities: WWP, SCDs in place      I&O's Summary    2021 07:01  -  2021 07:00  --------------------------------------------------------  IN: 500 mL / OUT: 930 mL / NET: -430 mL        LABS:                        15.7   7.90  )-----------( 241      ( 2021 07:34 )             44.9     06-04    137  |  98  |  10  ----------------------------<  x   4.0   |  25  |  0.94    Ca    9.5      2021 07:34  Phos  2.7     06-04  Mg     1.9     06-04    TPro  8.0  /  Alb  3.9  /  TBili  0.5  /  DBili  x   /  AST  19  /  ALT  35  /  AlkPhos  106  06-03    PT/INR - ( 2021 22:24 )   PT: 12.6 sec;   INR: 1.07       PTT - ( 2021 22:24 )  PTT:30.4 sec    Urinalysis Basic - ( 2021 23:55 )  Color: Yellow / Appearance: Clear / S.020 / pH: x  Gluc: x / Ketone: NEGATIVE  / Bili: NEGATIVE / Urobili: 1.0 E.U./dL   Blood: x / Protein: Trace mg/dL / Nitrite: NEGATIVE   Leuk Esterase: NEGATIVE / RBC: x / WBC 5-10 /HPF   Sq Epi: x / Non Sq Epi: x / Bacteria: x    LIVER FUNCTIONS - ( 2021 22:24 )  Alb: 3.9 g/dL / Pro: 8.0 g/dL / ALK PHOS: 106 U/L / ALT: 35 U/L / AST: 19 U/L / GGT: x

## 2021-06-04 NOTE — CONSULT NOTE ADULT - SUBJECTIVE AND OBJECTIVE BOX
Patient is a 66y old  Male who presents with a chief complaint of     INTERVAL HPI/OVERNIGHT EVENTS: Per H and P: 66 year old male with HIV on ART. 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 Denies any abdominal surgery. Patient reports presenting with sharp and crampy, intermittent abdominal pain for the past 2 days, lasting a few minutes and recurring every 30 to 40 min. Pain non-radiating, associated with mild nausea. Denies vomit, fever, chills, previous similar episode in the past, HX of IBD. Able to climb up two flight without SOB, does not remember when was the last echocardiogram. Last BM and Flatus was 2 days ago when the symptoms initiated. Last colonoscopy was 2 years ago with normal findings. A CT scan performed before admission is compatible with partial SBO and distal ileal wall thickening infectious in etiology, or reflecting inflammatory bowel disease. Viral load undetectable with Normal CD4 (per patient recall).   Patient transferred from [facility], admitted to regional floor. Afebrile, HDS, alert and oriented, in some distress due to the abdominal pain. NGT placed.     NGT placed w/ significant output, states abdominal pain is greatly improved.     In terms of exercise tolerance, can perform >10 METS, denies orthopnea or BOBO. Last saw his cardiologist a few years ago. Takes aspirin daily.     Denies hx of CVA, CKD, IDDM, CHF.     PAST MEDICAL & SURGICAL HISTORY:  Human immunodeficiency virus (HIV) infection    HTN (hypertension)    DM (diabetes mellitus)    Vocal cord cancer    Chronic GERD    HLD (hyperlipidemia)    History of herpes zoster with AIDS    Heart attack    History of anal cancer    S/P radiotherapy    No significant past surgical history        SOCIAL HISTORY  Alcohol: 2-4 drinks a month  Tobacco: never smoker   Illicit substance use: denies      FAMILY HISTORY: denies FH of SBO    REVIEW OF SYSTEMS:  CONSTITUTIONAL: No fever, weight loss, or fatigue  EYES: No eye pain, visual disturbances, or discharge  ENMT:  No difficulty hearing, tinnitus, vertigo; No sinus or throat pain  NECK: No pain or stiffness  RESPIRATORY: No cough, wheezing, chills or hemoptysis; No shortness of breath  CARDIOVASCULAR: No chest pain, palpitations, dizziness, or leg swelling  GASTROINTESTINAL: No abdominal or epigastric pain. No nausea, vomiting, or hematemesis; No diarrhea or constipation. No melena or hematochezia.  GENITOURINARY: No dysuria, frequency, hematuria, or incontinence  NEUROLOGICAL: No headaches, memory loss, loss of strength, numbness, or tremors  SKIN: No itching, burning, rashes, or lesions   LYMPH NODES: No enlarged glands  ENDOCRINE: No heat or cold intolerance; No hair loss  MUSCULOSKELETAL: No joint pain or swelling; No muscle, back, or extremity pain  PSYCHIATRIC: No depression, anxiety, mood swings, or difficulty sleeping  HEME/LYMPH: No easy bruising, or bleeding gums    T(C): 36.7 (21 @ 08:55), Max: 37.3 (21 @ 21:51)  HR: 65 (21 @ 08:55) (62 - 77)  BP: 156/85 (21 @ 08:55) (156/85 - 168/87)  RR: 18 (21 @ 08:55) (17 - 18)  SpO2: 96% (21 @ 08:55) (96% - 98%)  Wt(kg): --  I&O's Summary    2021 07:  -  2021 07:00  --------------------------------------------------------  IN: 500 mL / OUT: 930 mL / NET: -430 mL    2021 07:  -  2021 12:14  --------------------------------------------------------  IN: 750 mL / OUT: 520 mL / NET: 230 mL        PHYSICAL EXAM:  GENERAL: NAD, sitting in bed comfortably, NGT in place to suction  HEAD:  Atraumatic, Normocephalic  EYES: EOMI,   ENMT: ; MMM  NECK: Supple, No JVD  NERVOUS SYSTEM:  Alert & Oriented X3, no focal deficits   CHEST/LUNG: Clear to percussion bilaterally; No rales, rhonchi, wheezing, or rubs  HEART: Regular rate and rhythm; No murmurs, rubs, or gallops  ABDOMEN: Soft, Nontender, Nondistended; Bowel sounds present  EXTREMITIES: , No clubbing, cyanosis, or edema  LYMPH: No lymphadenopathy noted        LABS:                        15.7   7.90  )-----------( 241      ( 2021 07:34 )             44.9     06-04    137  |  98  |  10  ----------------------------<  167<H>  4.0   |  25  |  0.94    Ca    9.5      2021 07:34  Phos  2.7     06-04  Mg     1.9     06-04    TPro  8.0  /  Alb  3.9  /  TBili  0.5  /  DBili  x   /  AST  19  /  ALT  35  /  AlkPhos  106  06-03    PT/INR - ( 2021 22:24 )   PT: 12.6 sec;   INR: 1.07          PTT - ( 2021 22:24 )  PTT:30.4 sec  Urinalysis Basic - ( 2021 23:55 )    Color: Yellow / Appearance: Clear / S.020 / pH: x  Gluc: x / Ketone: NEGATIVE  / Bili: NEGATIVE / Urobili: 1.0 E.U./dL   Blood: x / Protein: Trace mg/dL / Nitrite: NEGATIVE   Leuk Esterase: NEGATIVE / RBC: x / WBC 5-10 /HPF   Sq Epi: x / Non Sq Epi: x / Bacteria: x      CAPILLARY BLOOD GLUCOSE            Urinalysis Basic - ( 2021 23:55 )    Color: Yellow / Appearance: Clear / S.020 / pH: x  Gluc: x / Ketone: NEGATIVE  / Bili: NEGATIVE / Urobili: 1.0 E.U./dL   Blood: x / Protein: Trace mg/dL / Nitrite: NEGATIVE   Leuk Esterase: NEGATIVE / RBC: x / WBC 5-10 /HPF   Sq Epi: x / Non Sq Epi: x / Bacteria: x        MEDICATIONS  (STANDING):  dextrose 40% Gel 15 Gram(s) Oral once  dextrose 5%. 1000 milliLiter(s) (50 mL/Hr) IV Continuous <Continuous>  dextrose 5%. 1000 milliLiter(s) (100 mL/Hr) IV Continuous <Continuous>  dextrose 50% Injectable 25 Gram(s) IV Push once  dextrose 50% Injectable 12.5 Gram(s) IV Push once  dextrose 50% Injectable 25 Gram(s) IV Push once  glucagon  Injectable 1 milliGRAM(s) IntraMuscular once  heparin   Injectable 5000 Unit(s) SubCutaneous every 8 hours  insulin lispro (ADMELOG) corrective regimen sliding scale   SubCutaneous Before meals and at bedtime  lactated ringers. 1000 milliLiter(s) (130 mL/Hr) IV Continuous <Continuous>  pantoprazole  Injectable 40 milliGRAM(s) IV Push every 24 hours    MEDICATIONS  (PRN):  ondansetron Injectable 4 milliGRAM(s) IV Push once PRN Nausea and/or Vomiting      RADIOLOGY & ADDITIONAL TESTS:    Imaging Personally Reviewed:  [ ] YES  [ ] NO    Consultant(s) Notes Reviewed:  [ ] YES  [ ] NO    Care Discussed with Consultants/Other Providers [ ] YES  [ ] NO

## 2021-06-04 NOTE — ED ADULT NURSE REASSESSMENT NOTE - NS ED NURSE REASSESS COMMENT FT1
Pt resting in stretcher, awaiting dispo. Denies further complaints at this time, breathing spontaneous and nonlabored.

## 2021-06-04 NOTE — H&P ADULT - HISTORY OF PRESENT ILLNESS
This is a 66 years old male with HIV on antiretroviral medication. 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 Denies any abdominal surgery. Patient reports presenting with sharp and crampy, intermittent abdominal pain for the past 2 days, lasting a few minutes and recurring every 30 to 40 min. Pain non-radiating, associated with mild nausea. Denies vomit, fever, chills, previous similar episode in the past, HX of IBD. Able to climb up two flight without SOB, does not remember when was the last echocardiogram. Last BM and Flatus was 2 days ago when the symptoms initiated. Last colonoscopy was 2 years ago with normal findings. A CT scan performed before admission is compatible with partial SBO and distal ileal wall thickening infectious in etiology, or reflecting inflammatory bowel disease.  Patient transferred from [facility], admitted to regional floor. Afebrile, HDS, alert and oriented, in some distress due to the abdominal pain. NGT placed.      This is a 66 years old male with HIV on antiretroviral medication. 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 Denies any abdominal surgery. Patient reports presenting with sharp and crampy, intermittent abdominal pain for the past 2 days, lasting a few minutes and recurring every 30 to 40 min. Pain non-radiating, associated with mild nausea. Denies vomit, fever, chills, previous similar episode in the past, HX of IBD. Able to climb up two flight without SOB, does not remember when was the last echocardiogram. Last BM and Flatus was 2 days ago when the symptoms initiated. Last colonoscopy was 2 years ago with normal findings. A CT scan performed before admission is compatible with partial SBO and distal ileal wall thickening infectious in etiology, or reflecting inflammatory bowel disease. Viral load undetectable with very low viral load (per patient recall).   Patient transferred from [facility], admitted to regional floor. Afebrile, HDS, alert and oriented, in some distress due to the abdominal pain. NGT placed.    This is a 66 years old male with HIV on antiretroviral medication. 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 Denies any abdominal surgery. Patient reports presenting with sharp and crampy, intermittent abdominal pain for the past 2 days, lasting a few minutes and recurring every 30 to 40 min. Pain non-radiating, associated with mild nausea. Denies vomit, fever, chills, previous similar episode in the past, HX of IBD. Able to climb up two flight without SOB, does not remember when was the last echocardiogram. Last BM and Flatus was 2 days ago when the symptoms initiated. Last colonoscopy was 2 years ago with normal findings. A CT scan performed before admission is compatible with partial SBO and distal ileal wall thickening infectious in etiology, or reflecting inflammatory bowel disease. Viral load undetectable with Normal CD4 (per patient recall).   Patient transferred from [facility], admitted to regional floor. Afebrile, HDS, alert and oriented, in some distress due to the abdominal pain. NGT placed.

## 2021-06-04 NOTE — H&P ADULT - NSHPSOCIALHISTORY_GEN_ALL_CORE
Lives with partner   Recent Travel: No recent travel  Substance Use (street drugs): ( x ) never used  Tobacco Usage:  ( x  ) never smoked  Alcohol Usage: social

## 2021-06-04 NOTE — H&P ADULT - NSHPLABSRESULTS_GEN_ALL_CORE
LABS:  cret                        15.0   7.91  )-----------( 239      ( 03 Jun 2021 22:24 )             44.3     06-03    141  |  101  |  14  ----------------------------<  176<H>  4.1   |  30  |  1.09    Ca    9.4      03 Jun 2021 22:24  Mg     2.0     06-03    TPro  8.0  /  Alb  3.9  /  TBili  0.5  /  DBili  x   /  AST  19  /  ALT  35  /  AlkPhos  106  06-03    PT/INR - ( 03 Jun 2021 22:24 )   PT: 12.6 sec;   INR: 1.07          PTT - ( 03 Jun 2021 22:24 )  PTT:30.4 sec    CT Abdomen and Pelvis w/ IV Cont:   ******PRELIMINARY REPORT******      FINDINGS:  Liver: Normal. No mass.  Gallbladder and bile ducts: Mild thickening of the very distal ileal wall, and contrast enhancement, suggesting distal ileitis. Distal ileal wall thickening up to 8.3 mm.  Pancreas: Normal. No ductal dilation.  Spleen: Normal. No splenomegaly.  Adrenal glands: Normal. No mass.  Kidneys and ureters: Normal. No hydronephrosis.  Stomach and bowel: Dilatation of the ileum, with transition point in the distal ileum, suggesting partial small bowel obstruction. Diameter of the ileum is up to 3 cm. Small duodenal diverticulum measuring 13 mm.  Appendix: Normal appendix.  Intraperitoneal space: Mild ascites in the deep pelvis posteriorly.  Vasculature: Unremarkable. No abdominal aortic aneurysm.  Lymph nodes: Unremarkable. No enlarged lymph nodes.  Urinary bladder: Unremarkable as visualized.  Reproductive: Unremarkable as visualized.  Bones/joints: Unremarkable. No acute fracture.  Soft tissues: See "Gallbladder and bile ducts" finding.    IMPRESSION:  1. Distal ileal wall thickening up to 8.3 mm with mild circumferential contrast enhancement, suggesting distal ileus, either infectious in etiology, or reflecting inflammatory bowel disease.  2. There is a partial small bowel obstruction as a result, with mid and distal small bowel loops, distended up to a diameter of 3 cm.  3. Mild ascites within the pelvis.  4. Normal appendix.

## 2021-06-04 NOTE — ED PROVIDER NOTE - OBJECTIVE STATEMENT
65 y/o male hx of HIV, MI s/p stents, anal and throat CA s/p remission Now here with acute abdominal pain that began earlier in the afternoon with persistent nausea. Patient denies chest pain,vomiting,diarrhea,fevers,chills,sob,trauma,loc. Patient states pain is diffuse and he has not been able to defecate today. Patient appears well NAD stable.

## 2021-06-04 NOTE — ED PROVIDER NOTE - CLINICAL SUMMARY MEDICAL DECISION MAKING FREE TEXT BOX
65 y/o male here with abd pain since this morning   with partial bowel obstruction s/p ileus   Endorsed to Surgery

## 2021-06-04 NOTE — CONSULT NOTE ADULT - ASSESSMENT
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     #Pre-op: can perform >10 METS, EKG reviewed - q waves indicative of prior MI, CAD is stable, RCRI class II risk (class III risk if high risk surgery), patient is optimized, can proceed to surgery if needed, c/w metoprolol, hold ACEi day of surgery if going  #SBO: care per primary team  #HIV: c/w ART, send CD4 and VL  #CAD: c/w metoprolol, c/w statin, on ASA at home - held for now  #Hypothyroidism: c/w synthroid  #DM: A1C 7.3, was on Metformin at home but discontinued 2/2 GI side effects, c/w SSI for now  #Hypertriglyceridemia: c/w Vascepa  #HTN: c/w metoprolol, hold ACEi day of surgery if going  #Anal cancer  #Vocal cord cancer  #DVT ppx: HSQ 66 year old male with HIV on ART, CAD w/ MI s/p PCI x3 in 2000, HTN, NIDDM, GERD, HLD, hypothyroidism, herpes, anal cancer in 1998 (s/p chemo-radiation), 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     #Pre-op: can perform >10 METS, EKG reviewed - q waves indicative of prior MI, CAD is stable, RCRI class II risk (class III risk if high risk surgery), patient is optimized, can proceed to surgery if needed, c/w metoprolol, hold ACEi day of surgery if going  #SBO: care per primary team  #HIV: c/w ART, send CD4 and VL  #CAD: c/w metoprolol, c/w statin, on ASA at home - held for now  #Hypothyroidism: c/w synthroid  #DM: A1C 7.3, was on Metformin at home but discontinued 2/2 GI side effects, c/w SSI for now  #Hypertriglyceridemia: c/w Vascepa  #HTN: c/w metoprolol, hold ACEi day of surgery if going, likely component of pain,  #Anal cancer  #Vocal cord cancer  #DVT ppx: HSQ

## 2021-06-04 NOTE — H&P ADULT - NSHPPHYSICALEXAM_GEN_ALL_CORE
ICU Vital Signs Last 24 Hrs  T(C): 37.2 (04 Jun 2021 02:25), Max: 37.3 (03 Jun 2021 21:51)  T(F): 98.9 (04 Jun 2021 02:25), Max: 99.1 (03 Jun 2021 21:51)  HR: 77 (04 Jun 2021 02:25) (65 - 77)  BP: 160/86 (04 Jun 2021 02:25) (160/86 - 167/89)  BP(mean): 111 (04 Jun 2021 02:25) (111 - 111)  RR: 17 (04 Jun 2021 02:25) (17 - 18)  SpO2: 98% (04 Jun 2021 02:25) (97% - 98%)    Physical Exam:    Constitutional: some distress due to abdominal pain  HEENT: anicteric sclera, no oropharyngeal erythema or exudates; MMM  Neck: supple  Respiratory: unlabored breathing, no retractions or use of accessory muscles   Gastrointestinal: soft, mildly distended, tender to deep palpation at the periumbilical area; no rebound or guarding;  Genitourinary: normal external genitalia  Extremities: WWP, no clubbing or cyanosis; no peripheral edema  Psychiatric: affect and characteristics of appearance, verbalizations, behaviors are appropriate

## 2021-06-05 LAB
ANION GAP SERPL CALC-SCNC: 14 MMOL/L — SIGNIFICANT CHANGE UP (ref 5–17)
APPEARANCE UR: CLEAR — SIGNIFICANT CHANGE UP
BACTERIA # UR AUTO: PRESENT /HPF
BILIRUB UR-MCNC: ABNORMAL
BUN SERPL-MCNC: 11 MG/DL — SIGNIFICANT CHANGE UP (ref 7–23)
CALCIUM SERPL-MCNC: 8.7 MG/DL — SIGNIFICANT CHANGE UP (ref 8.4–10.5)
CHLORIDE SERPL-SCNC: 100 MMOL/L — SIGNIFICANT CHANGE UP (ref 96–108)
CO2 SERPL-SCNC: 22 MMOL/L — SIGNIFICANT CHANGE UP (ref 22–31)
COLOR SPEC: YELLOW — SIGNIFICANT CHANGE UP
COMMENT - URINE: SIGNIFICANT CHANGE UP
COVID-19 SPIKE DOMAIN AB INTERP: POSITIVE
COVID-19 SPIKE DOMAIN ANTIBODY RESULT: >250 U/ML — HIGH
CREAT SERPL-MCNC: 1.01 MG/DL — SIGNIFICANT CHANGE UP (ref 0.5–1.3)
DIFF PNL FLD: NEGATIVE — SIGNIFICANT CHANGE UP
EPI CELLS # UR: SIGNIFICANT CHANGE UP /HPF (ref 0–5)
GLUCOSE BLDC GLUCOMTR-MCNC: 125 MG/DL — HIGH (ref 70–99)
GLUCOSE BLDC GLUCOMTR-MCNC: 127 MG/DL — HIGH (ref 70–99)
GLUCOSE BLDC GLUCOMTR-MCNC: 134 MG/DL — HIGH (ref 70–99)
GLUCOSE BLDC GLUCOMTR-MCNC: 151 MG/DL — HIGH (ref 70–99)
GLUCOSE SERPL-MCNC: 157 MG/DL — HIGH (ref 70–99)
GLUCOSE UR QL: NEGATIVE — SIGNIFICANT CHANGE UP
HCT VFR BLD CALC: 42.5 % — SIGNIFICANT CHANGE UP (ref 39–50)
HGB BLD-MCNC: 14.8 G/DL — SIGNIFICANT CHANGE UP (ref 13–17)
KETONES UR-MCNC: 40 MG/DL
LEUKOCYTE ESTERASE UR-ACNC: NEGATIVE — SIGNIFICANT CHANGE UP
MAGNESIUM SERPL-MCNC: 2.1 MG/DL — SIGNIFICANT CHANGE UP (ref 1.6–2.6)
MCHC RBC-ENTMCNC: 32.2 PG — SIGNIFICANT CHANGE UP (ref 27–34)
MCHC RBC-ENTMCNC: 34.8 GM/DL — SIGNIFICANT CHANGE UP (ref 32–36)
MCV RBC AUTO: 92.4 FL — SIGNIFICANT CHANGE UP (ref 80–100)
NITRITE UR-MCNC: NEGATIVE — SIGNIFICANT CHANGE UP
NRBC # BLD: 0 /100 WBCS — SIGNIFICANT CHANGE UP (ref 0–0)
PH UR: 6 — SIGNIFICANT CHANGE UP (ref 5–8)
PHOSPHATE SERPL-MCNC: 2.7 MG/DL — SIGNIFICANT CHANGE UP (ref 2.5–4.5)
PLATELET # BLD AUTO: 227 K/UL — SIGNIFICANT CHANGE UP (ref 150–400)
POTASSIUM SERPL-MCNC: 3.8 MMOL/L — SIGNIFICANT CHANGE UP (ref 3.5–5.3)
POTASSIUM SERPL-SCNC: 3.8 MMOL/L — SIGNIFICANT CHANGE UP (ref 3.5–5.3)
PROT UR-MCNC: 30 MG/DL
RBC # BLD: 4.6 M/UL — SIGNIFICANT CHANGE UP (ref 4.2–5.8)
RBC # FLD: 12.9 % — SIGNIFICANT CHANGE UP (ref 10.3–14.5)
RBC CASTS # UR COMP ASSIST: < 5 /HPF — SIGNIFICANT CHANGE UP
SARS-COV-2 IGG+IGM SERPL QL IA: >250 U/ML — HIGH
SARS-COV-2 IGG+IGM SERPL QL IA: POSITIVE
SODIUM SERPL-SCNC: 136 MMOL/L — SIGNIFICANT CHANGE UP (ref 135–145)
SP GR SPEC: >=1.03 — SIGNIFICANT CHANGE UP (ref 1–1.03)
UROBILINOGEN FLD QL: 1 E.U./DL — SIGNIFICANT CHANGE UP
WBC # BLD: 9.23 K/UL — SIGNIFICANT CHANGE UP (ref 3.8–10.5)
WBC # FLD AUTO: 9.23 K/UL — SIGNIFICANT CHANGE UP (ref 3.8–10.5)
WBC UR QL: ABNORMAL /HPF

## 2021-06-05 PROCEDURE — 71045 X-RAY EXAM CHEST 1 VIEW: CPT | Mod: 26

## 2021-06-05 PROCEDURE — 99233 SBSQ HOSP IP/OBS HIGH 50: CPT | Mod: GC

## 2021-06-05 RX ORDER — POTASSIUM PHOSPHATE, MONOBASIC POTASSIUM PHOSPHATE, DIBASIC 236; 224 MG/ML; MG/ML
15 INJECTION, SOLUTION INTRAVENOUS ONCE
Refills: 0 | Status: COMPLETED | OUTPATIENT
Start: 2021-06-05 | End: 2021-06-05

## 2021-06-05 RX ORDER — ACETAMINOPHEN 500 MG
1000 TABLET ORAL ONCE
Refills: 0 | Status: COMPLETED | OUTPATIENT
Start: 2021-06-05 | End: 2021-06-05

## 2021-06-05 RX ORDER — DIPHENHYDRAMINE HCL 50 MG
25 CAPSULE ORAL ONCE
Refills: 0 | Status: COMPLETED | OUTPATIENT
Start: 2021-06-05 | End: 2021-06-05

## 2021-06-05 RX ORDER — PANTOPRAZOLE SODIUM 20 MG/1
40 TABLET, DELAYED RELEASE ORAL EVERY 12 HOURS
Refills: 0 | Status: DISCONTINUED | OUTPATIENT
Start: 2021-06-05 | End: 2021-06-08

## 2021-06-05 RX ADMIN — Medication 1000 MILLIGRAM(S): at 14:15

## 2021-06-05 RX ADMIN — Medication 5 MILLIGRAM(S): at 05:31

## 2021-06-05 RX ADMIN — POTASSIUM PHOSPHATE, MONOBASIC POTASSIUM PHOSPHATE, DIBASIC 62.5 MILLIMOLE(S): 236; 224 INJECTION, SOLUTION INTRAVENOUS at 11:22

## 2021-06-05 RX ADMIN — Medication 1000 MILLIGRAM(S): at 22:32

## 2021-06-05 RX ADMIN — PANTOPRAZOLE SODIUM 40 MILLIGRAM(S): 20 TABLET, DELAYED RELEASE ORAL at 05:30

## 2021-06-05 RX ADMIN — Medication 1: at 08:02

## 2021-06-05 RX ADMIN — BENZOCAINE AND MENTHOL 1 LOZENGE: 5; 1 LIQUID ORAL at 22:07

## 2021-06-05 RX ADMIN — Medication 25 MILLIGRAM(S): at 23:59

## 2021-06-05 RX ADMIN — VALACYCLOVIR 500 MILLIGRAM(S): 500 TABLET, FILM COATED ORAL at 11:22

## 2021-06-05 RX ADMIN — Medication 400 MILLIGRAM(S): at 22:07

## 2021-06-05 RX ADMIN — Medication 400 MILLIGRAM(S): at 13:53

## 2021-06-05 RX ADMIN — BENZOCAINE AND MENTHOL 1 LOZENGE: 5; 1 LIQUID ORAL at 12:33

## 2021-06-05 RX ADMIN — HEPARIN SODIUM 5000 UNIT(S): 5000 INJECTION INTRAVENOUS; SUBCUTANEOUS at 13:12

## 2021-06-05 RX ADMIN — PANTOPRAZOLE SODIUM 40 MILLIGRAM(S): 20 TABLET, DELAYED RELEASE ORAL at 18:24

## 2021-06-05 RX ADMIN — HEPARIN SODIUM 5000 UNIT(S): 5000 INJECTION INTRAVENOUS; SUBCUTANEOUS at 05:31

## 2021-06-05 RX ADMIN — Medication 1000 MILLIGRAM(S): at 05:46

## 2021-06-05 RX ADMIN — Medication 5 MILLIGRAM(S): at 11:22

## 2021-06-05 RX ADMIN — HEPARIN SODIUM 5000 UNIT(S): 5000 INJECTION INTRAVENOUS; SUBCUTANEOUS at 22:07

## 2021-06-05 RX ADMIN — Medication 5 MILLIGRAM(S): at 18:24

## 2021-06-05 RX ADMIN — Medication 40 MICROGRAM(S): at 09:29

## 2021-06-05 RX ADMIN — Medication 400 MILLIGRAM(S): at 05:31

## 2021-06-05 NOTE — PROGRESS NOTE ADULT - SUBJECTIVE AND OBJECTIVE BOX
Patient is a 66y old  Male who presents with a chief complaint of   INTERVAL EVENTS:  No acute events overnight   Passing gas now       SUBJECTIVE:  Feels 'much better' since placement of NG tube - less abdominal discomfort.   Passing gas   Rest of 12 point review of systems negative unless documented otherwise in note.        MEDICATIONS:  MEDICATIONS  (STANDING):  benzocaine 15 mG/menthol 3.6 mG Lozenge 1 Lozenge Oral three times a day  dextrose 40% Gel 15 Gram(s) Oral once  dextrose 5%. 1000 milliLiter(s) (50 mL/Hr) IV Continuous <Continuous>  dextrose 5%. 1000 milliLiter(s) (100 mL/Hr) IV Continuous <Continuous>  dextrose 50% Injectable 25 Gram(s) IV Push once  dextrose 50% Injectable 12.5 Gram(s) IV Push once  dextrose 50% Injectable 25 Gram(s) IV Push once  diphenhydrAMINE   Injectable 25 milliGRAM(s) IV Push once  glucagon  Injectable 1 milliGRAM(s) IntraMuscular once  heparin   Injectable 5000 Unit(s) SubCutaneous every 8 hours  insulin lispro (ADMELOG) corrective regimen sliding scale   SubCutaneous Before meals and at bedtime  lactated ringers. 1000 milliLiter(s) (130 mL/Hr) IV Continuous <Continuous>  levothyroxine Injectable 40 MICROGram(s) IV Push <User Schedule>  metoprolol tartrate Injectable 5 milliGRAM(s) IV Push every 6 hours  pantoprazole  Injectable 40 milliGRAM(s) IV Push every 12 hours  valACYclovir 500 milliGRAM(s) Oral daily    MEDICATIONS  (PRN):  ondansetron Injectable 4 milliGRAM(s) IV Push once PRN Nausea and/or Vomiting      Allergies    sulfa drugs (Anaphylaxis)    Intolerances        OBJECTIVE:  Vital Signs Last 24 Hrs  T(C): 37.7 (05 Jun 2021 21:16), Max: 38.3 (05 Jun 2021 04:20)  T(F): 99.8 (05 Jun 2021 21:16), Max: 100.9 (05 Jun 2021 04:20)  HR: 77 (05 Jun 2021 21:16) (68 - 85)  BP: 144/79 (05 Jun 2021 21:16) (120/68 - 148/72)  BP(mean): 94 (05 Jun 2021 06:32) (93 - 94)  RR: 17 (05 Jun 2021 21:16) (16 - 18)  SpO2: 96% (05 Jun 2021 21:16) (94% - 96%)  I&O's Summary    04 Jun 2021 07:01  -  05 Jun 2021 07:00  --------------------------------------------------------  IN: 3260 mL / OUT: 1745 mL / NET: 1515 mL    05 Jun 2021 07:01  -  05 Jun 2021 22:47  --------------------------------------------------------  IN: 1260 mL / OUT: 1200 mL / NET: 60 mL        PHYSICAL EXAM:  Gen: Sitting in bed at time of exam; in no acute distress  HEENT: NCAT, MMM, clear OP, NG tube in place, draining fluid.   Neck: supple, trachea at midline  CV: RRR, +S1/S2  Pulm: adequate respiratory effort, no increase in work of breathing  Abd: soft, mild-moderate tenderness to palpation in periumbilical area  Skin: warm and dry, no new rashes vs prior report  Ext: WWP, no LE edema  Neuro: AOx3, no gross focal neurological deficits  Psych: affect and behavior appropriate, pleasant    LABS:                        14.8   9.23  )-----------( 227      ( 05 Jun 2021 06:49 )             42.5     06-05    136  |  100  |  11  ----------------------------<  157<H>  3.8   |  22  |  1.01    Ca    8.7      05 Jun 2021 06:49  Phos  2.7     06-05  Mg     2.1     06-05          CAPILLARY BLOOD GLUCOSE      POCT Blood Glucose.: 127 mg/dL (05 Jun 2021 22:11)  POCT Blood Glucose.: 125 mg/dL (05 Jun 2021 16:15)  POCT Blood Glucose.: 134 mg/dL (05 Jun 2021 12:01)  POCT Blood Glucose.: 151 mg/dL (05 Jun 2021 07:23)    Urinalysis Basic - ( 05 Jun 2021 16:02 )    Color: x / Appearance: x / SG: x / pH: x  Gluc: x / Ketone: x  / Bili: x / Urobili: x   Blood: x / Protein: x / Nitrite: x   Leuk Esterase: x / RBC: < 5 /HPF / WBC 5-10 /HPF   Sq Epi: x / Non Sq Epi: 0-5 /HPF / Bacteria: Present /HPF        MICRODATA:      RADIOLOGY/OTHER STUDIES:

## 2021-06-05 NOTE — PROGRESS NOTE ADULT - SUBJECTIVE AND OBJECTIVE BOX
SUBJECTIVE:   Patient seen and examined bedside by chief resident.  Denies N/V/CP/SOB  +F/-BM   denies abd pain   NGT repositioned, confirmed via CXR    heparin   Injectable 5000 Unit(s) SubCutaneous every 8 hours  metoprolol tartrate Injectable 5 milliGRAM(s) IV Push every 6 hours  valACYclovir 500 milliGRAM(s) Oral daily    MEDICATIONS  (PRN):  ondansetron Injectable 4 milliGRAM(s) IV Push once PRN Nausea and/or Vomiting      I&O's Detail    2021 07:01  -  2021 07:00  --------------------------------------------------------  IN:    IV PiggyBack: 300 mL    Lactated Ringers: 2960 mL  Total IN: 3260 mL    OUT:    Nasogastric/Oral tube (mL): 370 mL    Oral Fluid: 0 mL    Voided (mL): 1375 mL  Total OUT: 1745 mL    Total NET: 1515 mL      2021 07:01  -  2021 13:39  --------------------------------------------------------  IN:    Lactated Ringers: 390 mL  Total IN: 390 mL    OUT:    Nasogastric/Oral tube (mL): 200 mL    Oral Fluid: 0 mL    Voided (mL): 600 mL  Total OUT: 800 mL    Total NET: -410 mL          Vital Signs Last 24 Hrs  T(C): 37.3 (2021 08:42), Max: 38.3 (2021 04:20)  T(F): 99.1 (2021 08:42), Max: 100.9 (2021 04:20)  HR: 70 (2021 08:42) (68 - 85)  BP: 148/72 (2021 08:42) (131/74 - 164/89)  BP(mean): 94 (2021 06:32) (93 - 106)  RR: 16 (2021 08:42) (16 - 17)  SpO2: 95% (2021 08:42) (94% - 97%)    General: NAD, resting comfortably in bed  C/V: NSR  Pulm: Nonlabored breathing, no respiratory distress  Abd: softly distended w mild TTP in all 4 abd quadrants  Extrem: WWP, no edema, SCDs in place    LABS:                        14.8   9.23  )-----------( 227      ( 2021 06:49 )             42.5     06-05    136  |  100  |  11  ----------------------------<  157<H>  3.8   |  22  |  1.01    Ca    8.7      2021 06:49  Phos  2.7     06-05  Mg     2.1     06-05    TPro  8.0  /  Alb  3.9  /  TBili  0.5  /  DBili  x   /  AST  19  /  ALT  35  /  AlkPhos  106  06-03    PT/INR - ( 2021 22:24 )   PT: 12.6 sec;   INR: 1.07          PTT - ( 2021 22:24 )  PTT:30.4 sec  Urinalysis Basic - ( 2021 23:55 )    Color: Yellow / Appearance: Clear / S.020 / pH: x  Gluc: x / Ketone: NEGATIVE  / Bili: NEGATIVE / Urobili: 1.0 E.U./dL   Blood: x / Protein: Trace mg/dL / Nitrite: NEGATIVE   Leuk Esterase: NEGATIVE / RBC: x / WBC 5-10 /HPF   Sq Epi: x / Non Sq Epi: x / Bacteria: x        RADIOLOGY & ADDITIONAL STUDIES:  CT Abdomen and Pelvis w/ IV Cont:   EXAM:  CT ABDOMEN AND PELVIS IC                           PROCEDURE DATE:  2021          INTERPRETATION:  [facility]  Preliminary Radiology Report  Call: [phone]  assistance Online chat: [email or web address]  Patient Name: CARIE HARLEY MRN: [id number]   (Age): 1955 66 Gender: M  Date of Exam: 2021 Accession: [id number]  Referring Physician: MARIJA GRECO # of Images: [id number]  Ordered As: CT ABDOMEN/PELVIS W    PROCEDURE INFORMATION:  Exam: CT Abdomen And Pelvis With Contrast  Exam date and time: 6/3/2021 11:48 PM  Age: 66 years old  Clinical indication: Abdominal pain    TECHNIQUE:  Imaging protocol: Computed tomography of the abdomen and pelvis with contrast.    COMPARISON:  No relevant prior studies available.    FINDINGS:  Liver: Normal. No mass.  Gallbladder and bile ducts: Mild thickening of the very distal ileal wall, and contrast enhancement, suggesting distal ileitis. Distal ileal wall thickening up to 8.3 mm.  Pancreas: Normal. No ductal dilation.  Spleen: Normal. No splenomegaly.  Adrenal glands: Normal. No mass.  Kidneys and ureters: Normal. No hydronephrosis.  Stomach and bowel: Dilatation of the ileum, with transition point in the distal ileum, suggesting partial small bowel obstruction. Diameter of the ileum is up to 3 cm. Small duodenal diverticulum measuring 13 mm.  Appendix: Normal appendix.  Intraperitoneal space: Mild ascites in the deep pelvis posteriorly.  Vasculature: Unremarkable. No abdominal aortic aneurysm.  Lymph nodes: Unremarkable. No enlarged lymph nodes.  Urinary bladder: Unremarkable as visualized.  Reproductive: Unremarkable as visualized.  Bones/joints: Unremarkable. No acute fracture.  Soft tissues: See "Gallbladder and bile ducts" finding.    IMPRESSION:  1.Distal ileal wall thickening up to 8.3 mm with mild circumferential contrast enhancement, suggesting distal ileus, either infectious in etiology, or reflecting inflammatory bowel disease.  2. There is a partial small bowel obstruction as a result, with mid and distal small bowel loops, distended up to a diameter of 3 cm.  3. Mild ascites within the pelvis.  4. Normal appendix.    Thank you for allowing us to participate in the care of your patient.  Dictated and Authenticated by: Vick Smith MD  2021 12:47 AM Eastern Time ([place] & [place])    The above report was submitted by the [facility] attending radiologist and copied to PowerScribe by resident Ravi Fragoso MD.      FINAL ATTENDING RADIOLOGIST REPORT:    CT SCAN OF ABDOMEN AND PELVIS    History: Abdominal pain. History of anal cancer.    Technique: CT scan of abdomen and pelvis was performed from lung bases through symphysis pubis. Intravenous contrast material was administered. Oral contrast was not utilized, as per ordering physician. Axial, sagittal and coronal reformatted images were reviewed.    Comparison: None.    Findings:    Lower chest: Heavy coronary artery calcification.    Liver:  Small low-density foci in segment 4 of the liver adjacent to the falciform ligament arein a common location for focal fatty infiltration or vascular anomaly.    Gallbladder: No radiopaque stones gallbladder.    Spleen:  Normal.    Pancreas:  Normal.    Adrenal glands:  Normal.    Kidneys: There are a few subcentimeter low-density renallesions bilaterally, likely small cysts.    Gastrointestinal tract: The stomach, duodenum, and jejunum are normal in caliber. 1.3 cm periampullary duodenal diverticulum. There are multiple fluid-filled, dilated ileal small bowel loops, with maximum diameter of 3.9 cm. There is a transition point in the right mid pelvis and the distal ileum, where there is a 2.3 cm long segment of thick-walled distal ileum, located approximately 7.0 cm proximal to the ileocecal valve. There is infiltration of the fat surrounding the thick-walled portion of distal ileum, with small pelvic ascites, and a few adjacent subcentimeter mesenteric lymph nodes. Mild mesenteric edema, with engorgement of the vasa recta adjacent to the dilated small bowel loops. Terminalileum and colon are collapsed. Normal appendix. These findings are consistent with high-grade small bowel obstruction. No abscess or free air.    Vessels: Small calcified plaque aorta and pelvic arteries.    Pelvic organs: Bladder unremarkable. Prostate size within normal limits.    Soft tissues: A few metallic anchors in the right inguinal region are consistent with prior hernia repair.    Bones: Degenerative changes of spine.    Impression: 1. High-grade small bowel obstruction with transition point at 2.3 cm long segment of thick-walled distal ileum. The differential diagnosis includes neoplasm, inflammatory bowel disease, such as Crohn's disease, adhesion, radiation enteritis. Clinical correlation recommended.    2. Small ascites.              Thank you for the opportunity to participate in the care of this patient.    RAVI FRAGOSO M.D., RADIOLOGY RESIDENT  This document has been electronically signed.  STARLA LÓPEZ MD; Attending Radiologist  This document has been electronically signed. 2021  8:43AM (21 @ 00:00)

## 2021-06-05 NOTE — PROGRESS NOTE ADULT - ASSESSMENT
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     #Pre-op: Pre-op medical evaluation per yesterday's consult note: can perform >10 METS, EKG reviewed - q waves indicative of prior MI, CAD is stable, RCRI class II risk (class III risk if high risk surgery), patient is optimized, can proceed to surgery if needed, c/w metoprolol, hold ACEi day of surgery if going  #SBO: care per primary team  #HIV: c/w ART, check CD4 and VL (can order patient's home ART, nevirapine 400mg qd and emtricitabine-tenofovir 200mg-300mg whenever primary team comfortable)   #CAD: c/w metoprolol, c/w statin, on ASA at home - held for now  #Hypothyroidism: c/w synthroid  #DM: A1C 7.3, was on Metformin at home but discontinued 2/2 GI side effects, c/w SSI for now  #Hypertriglyceridemia: c/w Vascepa  #HTN: c/w metoprolol, hold ACEi day of surgery if going, likely component of pain,  #Anal cancer  #Vocal cord cancer  #DVT ppx: HSQ

## 2021-06-05 NOTE — CHART NOTE - NSCHARTNOTEFT_GEN_A_CORE
KEHINDE:     HDS  Denies N/V/CP/SOB  NGT w bilious output  +F x1/-BM   Abd softly distended, mild TTP in all abd quadrants, most Prom in LUQ   will continue to monitor

## 2021-06-05 NOTE — PROGRESS NOTE ADULT - ASSESSMENT
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 Presenting with abdominal pain and nausea. Last BF 2 days ago, CT with evidence of SBO.     NPO/IVF  NGT to LIWS  Pain/nausea control (avoid narcotics)  OOBA/SCD/IS/SQH  KEHINDE  AM labs  Valacyclovir oral

## 2021-06-06 LAB
ANION GAP SERPL CALC-SCNC: 13 MMOL/L — SIGNIFICANT CHANGE UP (ref 5–17)
BUN SERPL-MCNC: 12 MG/DL — SIGNIFICANT CHANGE UP (ref 7–23)
CALCIUM SERPL-MCNC: 8.6 MG/DL — SIGNIFICANT CHANGE UP (ref 8.4–10.5)
CHLORIDE SERPL-SCNC: 100 MMOL/L — SIGNIFICANT CHANGE UP (ref 96–108)
CO2 SERPL-SCNC: 22 MMOL/L — SIGNIFICANT CHANGE UP (ref 22–31)
CREAT SERPL-MCNC: 0.98 MG/DL — SIGNIFICANT CHANGE UP (ref 0.5–1.3)
GLUCOSE BLDC GLUCOMTR-MCNC: 116 MG/DL — HIGH (ref 70–99)
GLUCOSE BLDC GLUCOMTR-MCNC: 121 MG/DL — HIGH (ref 70–99)
GLUCOSE BLDC GLUCOMTR-MCNC: 122 MG/DL — HIGH (ref 70–99)
GLUCOSE BLDC GLUCOMTR-MCNC: 132 MG/DL — HIGH (ref 70–99)
GLUCOSE SERPL-MCNC: 136 MG/DL — HIGH (ref 70–99)
HCT VFR BLD CALC: 41.5 % — SIGNIFICANT CHANGE UP (ref 39–50)
HGB BLD-MCNC: 14.1 G/DL — SIGNIFICANT CHANGE UP (ref 13–17)
MAGNESIUM SERPL-MCNC: 1.8 MG/DL — SIGNIFICANT CHANGE UP (ref 1.6–2.6)
MCHC RBC-ENTMCNC: 31.8 PG — SIGNIFICANT CHANGE UP (ref 27–34)
MCHC RBC-ENTMCNC: 34 GM/DL — SIGNIFICANT CHANGE UP (ref 32–36)
MCV RBC AUTO: 93.7 FL — SIGNIFICANT CHANGE UP (ref 80–100)
NRBC # BLD: 0 /100 WBCS — SIGNIFICANT CHANGE UP (ref 0–0)
PHOSPHATE SERPL-MCNC: 2.4 MG/DL — LOW (ref 2.5–4.5)
PLATELET # BLD AUTO: 213 K/UL — SIGNIFICANT CHANGE UP (ref 150–400)
POTASSIUM SERPL-MCNC: 4.1 MMOL/L — SIGNIFICANT CHANGE UP (ref 3.5–5.3)
POTASSIUM SERPL-SCNC: 4.1 MMOL/L — SIGNIFICANT CHANGE UP (ref 3.5–5.3)
RBC # BLD: 4.43 M/UL — SIGNIFICANT CHANGE UP (ref 4.2–5.8)
RBC # FLD: 12.8 % — SIGNIFICANT CHANGE UP (ref 10.3–14.5)
SODIUM SERPL-SCNC: 135 MMOL/L — SIGNIFICANT CHANGE UP (ref 135–145)
WBC # BLD: 9.51 K/UL — SIGNIFICANT CHANGE UP (ref 3.8–10.5)
WBC # FLD AUTO: 9.51 K/UL — SIGNIFICANT CHANGE UP (ref 3.8–10.5)

## 2021-06-06 PROCEDURE — 99233 SBSQ HOSP IP/OBS HIGH 50: CPT

## 2021-06-06 RX ORDER — ACETAMINOPHEN 500 MG
1000 TABLET ORAL ONCE
Refills: 0 | Status: COMPLETED | OUTPATIENT
Start: 2021-06-06 | End: 2021-06-06

## 2021-06-06 RX ORDER — DIPHENHYDRAMINE HCL 50 MG
25 CAPSULE ORAL ONCE
Refills: 0 | Status: COMPLETED | OUTPATIENT
Start: 2021-06-06 | End: 2021-06-07

## 2021-06-06 RX ORDER — DIPHENHYDRAMINE HCL 50 MG
25 CAPSULE ORAL ONCE
Refills: 0 | Status: DISCONTINUED | OUTPATIENT
Start: 2021-06-06 | End: 2021-06-06

## 2021-06-06 RX ADMIN — Medication 5 MILLIGRAM(S): at 19:04

## 2021-06-06 RX ADMIN — HEPARIN SODIUM 5000 UNIT(S): 5000 INJECTION INTRAVENOUS; SUBCUTANEOUS at 05:50

## 2021-06-06 RX ADMIN — PANTOPRAZOLE SODIUM 40 MILLIGRAM(S): 20 TABLET, DELAYED RELEASE ORAL at 19:03

## 2021-06-06 RX ADMIN — HEPARIN SODIUM 5000 UNIT(S): 5000 INJECTION INTRAVENOUS; SUBCUTANEOUS at 14:43

## 2021-06-06 RX ADMIN — PANTOPRAZOLE SODIUM 40 MILLIGRAM(S): 20 TABLET, DELAYED RELEASE ORAL at 05:50

## 2021-06-06 RX ADMIN — BENZOCAINE AND MENTHOL 1 LOZENGE: 5; 1 LIQUID ORAL at 14:43

## 2021-06-06 RX ADMIN — Medication 5 MILLIGRAM(S): at 05:50

## 2021-06-06 RX ADMIN — Medication 40 MICROGRAM(S): at 08:27

## 2021-06-06 RX ADMIN — Medication 1000 MILLIGRAM(S): at 21:30

## 2021-06-06 RX ADMIN — HEPARIN SODIUM 5000 UNIT(S): 5000 INJECTION INTRAVENOUS; SUBCUTANEOUS at 21:34

## 2021-06-06 RX ADMIN — VALACYCLOVIR 500 MILLIGRAM(S): 500 TABLET, FILM COATED ORAL at 12:59

## 2021-06-06 RX ADMIN — BENZOCAINE AND MENTHOL 1 LOZENGE: 5; 1 LIQUID ORAL at 21:34

## 2021-06-06 RX ADMIN — Medication 1000 MILLIGRAM(S): at 08:57

## 2021-06-06 RX ADMIN — Medication 400 MILLIGRAM(S): at 20:58

## 2021-06-06 RX ADMIN — Medication 5 MILLIGRAM(S): at 00:00

## 2021-06-06 RX ADMIN — Medication 400 MILLIGRAM(S): at 08:27

## 2021-06-06 RX ADMIN — BENZOCAINE AND MENTHOL 1 LOZENGE: 5; 1 LIQUID ORAL at 05:56

## 2021-06-06 RX ADMIN — Medication 5 MILLIGRAM(S): at 12:59

## 2021-06-06 NOTE — PROGRESS NOTE ADULT - SUBJECTIVE AND OBJECTIVE BOX
SUBJECTIVE: Patient seen and examined bedside by chief resident, feels well. Endorses one pop of flatus overnight, no nausea.     heparin   Injectable 5000 Unit(s) SubCutaneous every 8 hours  metoprolol tartrate Injectable 5 milliGRAM(s) IV Push every 6 hours  valACYclovir 500 milliGRAM(s) Oral daily    MEDICATIONS  (PRN):  ondansetron Injectable 4 milliGRAM(s) IV Push once PRN Nausea and/or Vomiting    I&O's Detail    2021 07:01  -  2021 07:00  --------------------------------------------------------  IN:    IV PiggyBack: 250 mL    IV PiggyBack: 200 mL    Lactated Ringers: 2470 mL  Total IN: 2920 mL    OUT:    Nasogastric/Oral tube (mL): 700 mL    Oral Fluid: 0 mL    Voided (mL): 1300 mL  Total OUT: 2000 mL    Total NET: 920 mL    Vital Signs Last 24 Hrs  T(C): 37.7 (2021 17:03), Max: 37.7 (2021 21:16)  T(F): 99.9 (2021 17:03), Max: 99.9 (2021 17:03)  HR: 75 (2021 17:03) (67 - 84)  BP: 145/76 (2021 17:03) (130/76 - 151/79)  BP(mean): --  RR: 17 (2021 17:03) (16 - 17)  SpO2: 94% (2021 17:03) (94% - 96%)    General: NAD, resting comfortably in bed  C/V: NSR  Pulm: Nonlabored breathing, no respiratory distress  Abd: softly distended, non tender to palpation in all 4 abd quadrants  Extrem: WWP, no edema, SCDs in place    LABS:                        14.1   9.51  )-----------( 213      ( 2021 07:03 )             41.5     06-06    135  |  100  |  12  ----------------------------<  136<H>  4.1   |  22  |  0.98    Ca    8.6      2021 07:03  Phos  2.4     -  Mg     1.8     -        Urinalysis Basic - ( 2021 16:02 )    Color: x / Appearance: x / SG: x / pH: x  Gluc: x / Ketone: x  / Bili: x / Urobili: x   Blood: x / Protein: x / Nitrite: x   Leuk Esterase: x / RBC: < 5 /HPF / WBC 5-10 /HPF   Sq Epi: x / Non Sq Epi: 0-5 /HPF / Bacteria: Present /HPF        RADIOLOGY & ADDITIONAL STUDIES:  CT Abdomen and Pelvis w/ IV Cont:   EXAM:  CT ABDOMEN AND PELVIS IC                           PROCEDURE DATE:  2021          INTERPRETATION:  [facility]  Preliminary Radiology Report  Call: [phone]  assistance Online chat: [email or web address]  Patient Name: CARIE HARLEY MRN: [id number]   (Age): 1955 66 Gender: M  Date of Exam: 2021 Accession: [id number]  Referring Physician: MARIJA GRECO # of Images: [id number]  Ordered As: CT ABDOMEN/PELVIS W    PROCEDURE INFORMATION:  Exam: CT Abdomen And Pelvis With Contrast  Exam date and time: 6/3/2021 11:48 PM  Age: 66 years old  Clinical indication: Abdominal pain    TECHNIQUE:  Imaging protocol: Computed tomography of the abdomen and pelvis with contrast.    COMPARISON:  No relevant prior studies available.    FINDINGS:  Liver: Normal. No mass.  Gallbladder and bile ducts: Mild thickening of the very distal ileal wall, and contrast enhancement, suggesting distal ileitis. Distal ileal wall thickening up to 8.3 mm.  Pancreas: Normal. No ductal dilation.  Spleen: Normal. No splenomegaly.  Adrenal glands: Normal. No mass.  Kidneys and ureters: Normal. No hydronephrosis.  Stomach and bowel: Dilatation of the ileum, with transition point in the distal ileum, suggesting partial small bowel obstruction. Diameter of the ileum is up to 3 cm. Small duodenal diverticulum measuring 13 mm.  Appendix: Normal appendix.  Intraperitoneal space: Mild ascites in the deep pelvis posteriorly.  Vasculature: Unremarkable. No abdominal aortic aneurysm.  Lymph nodes: Unremarkable. No enlarged lymph nodes.  Urinary bladder: Unremarkable as visualized.  Reproductive: Unremarkable as visualized.  Bones/joints: Unremarkable. No acute fracture.  Soft tissues: See "Gallbladder and bile ducts" finding.    IMPRESSION:  1.Distal ileal wall thickening up to 8.3 mm with mild circumferential contrast enhancement, suggesting distal ileus, either infectious in etiology, or reflecting inflammatory bowel disease.  2. There is a partial small bowel obstruction as a result, with mid and distal small bowel loops, distended up to a diameter of 3 cm.  3. Mild ascites within the pelvis.  4. Normal appendix.    Thank you for allowing us to participate in the care of your patient.  Dictated and Authenticated by: Vick Smith MD  2021 12:47 AM Eastern Time ([place] & [place])    The above report was submitted by the [facility] attending radiologist and copied to PowerScribe by resident Ravi Fragoso MD.      FINAL ATTENDING RADIOLOGIST REPORT:    CT SCAN OF ABDOMEN AND PELVIS    History: Abdominal pain. History of anal cancer.    Technique: CT scan of abdomen and pelvis was performed from lung bases through symphysis pubis. Intravenous contrast material was administered. Oral contrast was not utilized, as per ordering physician. Axial, sagittal and coronal reformatted images were reviewed.    Comparison: None.    Findings:    Lower chest: Heavy coronary artery calcification.    Liver:  Small low-density foci in segment 4 of the liver adjacent to the falciform ligament arein a common location for focal fatty infiltration or vascular anomaly.    Gallbladder: No radiopaque stones gallbladder.    Spleen:  Normal.    Pancreas:  Normal.    Adrenal glands:  Normal.    Kidneys: There are a few subcentimeter low-density renallesions bilaterally, likely small cysts.    Gastrointestinal tract: The stomach, duodenum, and jejunum are normal in caliber. 1.3 cm periampullary duodenal diverticulum. There are multiple fluid-filled, dilated ileal small bowel loops, with maximum diameter of 3.9 cm. There is a transition point in the right mid pelvis and the distal ileum, where there is a 2.3 cm long segment of thick-walled distal ileum, located approximately 7.0 cm proximal to the ileocecal valve. There is infiltration of the fat surrounding the thick-walled portion of distal ileum, with small pelvic ascites, and a few adjacent subcentimeter mesenteric lymph nodes. Mild mesenteric edema, with engorgement of the vasa recta adjacent to the dilated small bowel loops. Terminalileum and colon are collapsed. Normal appendix. These findings are consistent with high-grade small bowel obstruction. No abscess or free air.    Vessels: Small calcified plaque aorta and pelvic arteries.    Pelvic organs: Bladder unremarkable. Prostate size within normal limits.    Soft tissues: A few metallic anchors in the right inguinal region are consistent with prior hernia repair.    Bones: Degenerative changes of spine.    Impression: 1. High-grade small bowel obstruction with transition point at 2.3 cm long segment of thick-walled distal ileum. The differential diagnosis includes neoplasm, inflammatory bowel disease, such as Crohn's disease, adhesion, radiation enteritis. Clinical correlation recommended.    2. Small ascites.              Thank you for the opportunity to participate in the care of this patient.    RAVI FRAGOSO M.D., RADIOLOGY RESIDENT  This document has been electronically signed.  STARLA LÓPEZ MD; Attending Radiologist  This document has been electronically signed. 2021  8:43AM (21 @ 00:00)      Culture - Blood (collected 21 @ 20:09)  Source: .Blood Blood  Preliminary Report (21 @ 09:01):    No growth at 12 hours    Culture - Blood (collected 21 @ 20:09)  Source: .Blood Blood  Preliminary Report (21 @ 09:01):    No growth at 12 hours

## 2021-06-06 NOTE — PROGRESS NOTE ADULT - ASSESSMENT
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 Presenting with abdominal pain and nausea. Last BF 2 days before admission ago, CT with evidence of SBO.     NPO/IVF  NGT to LIWS  Pain/nausea control (avoid narcotics)  OOBA/SCD/IS/SQH  KEHINDE  AM labs  Valacyclovir oral

## 2021-06-06 NOTE — PROGRESS NOTE ADULT - SUBJECTIVE AND OBJECTIVE BOX
Patient is a 66y old  Male who presents with a chief complaint of   INTERVAL EVENTS:  - No acute events overnight   - Had flatus overnight. Has not noticed any flatus today (time of eval around noon)    SUBJECTIVE:  - Abdomen discomfort similar to yesterday. Not worsened  - Less tenderness to deep palpation of abdomen   - Abdomen remains distended.   Rest of 12 point review of systems negative unless documented otherwise in note.    MEDICATIONS:  MEDICATIONS  (STANDING):  benzocaine 15 mG/menthol 3.6 mG Lozenge 1 Lozenge Oral three times a day  dextrose 40% Gel 15 Gram(s) Oral once  dextrose 5%. 1000 milliLiter(s) (50 mL/Hr) IV Continuous <Continuous>  dextrose 5%. 1000 milliLiter(s) (100 mL/Hr) IV Continuous <Continuous>  dextrose 50% Injectable 25 Gram(s) IV Push once  dextrose 50% Injectable 12.5 Gram(s) IV Push once  dextrose 50% Injectable 25 Gram(s) IV Push once  glucagon  Injectable 1 milliGRAM(s) IntraMuscular once  heparin   Injectable 5000 Unit(s) SubCutaneous every 8 hours  insulin lispro (ADMELOG) corrective regimen sliding scale   SubCutaneous Before meals and at bedtime  lactated ringers. 1000 milliLiter(s) (130 mL/Hr) IV Continuous <Continuous>  levothyroxine Injectable 40 MICROGram(s) IV Push <User Schedule>  metoprolol tartrate Injectable 5 milliGRAM(s) IV Push every 6 hours  pantoprazole  Injectable 40 milliGRAM(s) IV Push every 12 hours  valACYclovir 500 milliGRAM(s) Oral daily    MEDICATIONS  (PRN):  ondansetron Injectable 4 milliGRAM(s) IV Push once PRN Nausea and/or Vomiting      Allergies    sulfa drugs (Anaphylaxis)    Intolerances        OBJECTIVE:  Vital Signs Last 24 Hrs  T(C): 38.3 (06 Jun 2021 20:49), Max: 38.3 (06 Jun 2021 20:49)  T(F): 101 (06 Jun 2021 20:49), Max: 101 (06 Jun 2021 20:49)  HR: 74 (06 Jun 2021 20:49) (67 - 84)  BP: 153/82 (06 Jun 2021 20:49) (130/76 - 153/82)  BP(mean): --  RR: 17 (06 Jun 2021 20:49) (16 - 17)  SpO2: 95% (06 Jun 2021 20:49) (94% - 95%)  I&O's Summary    05 Jun 2021 07:01  -  06 Jun 2021 07:00  --------------------------------------------------------  IN: 2920 mL / OUT: 2000 mL / NET: 920 mL        PHYSICAL EXAM:  Gen: Sitting in bed at time of exam; in no acute distress  HEENT: NCAT, MMM, clear OP, NG tube in place, draining fluid.   Neck: supple, trachea at midline  CV: RRR, +S1/S2  Pulm: adequate respiratory effort, no increase in work of breathing  Abd: soft, distended.  mild-moderate tenderness to palpation in periumbilical area  Skin: warm and dry, no new rashes vs prior report  Ext: WWP, no LE edema  Neuro: AOx3, no gross focal neurological deficits  Psych: affect and behavior appropriate, pleasant      LABS:                        14.1   9.51  )-----------( 213      ( 06 Jun 2021 07:03 )             41.5     06-06    135  |  100  |  12  ----------------------------<  136<H>  4.1   |  22  |  0.98    Ca    8.6      06 Jun 2021 07:03  Phos  2.4     06-06  Mg     1.8     06-06          CAPILLARY BLOOD GLUCOSE      POCT Blood Glucose.: 116 mg/dL (06 Jun 2021 16:20)  POCT Blood Glucose.: 121 mg/dL (06 Jun 2021 13:17)  POCT Blood Glucose.: 132 mg/dL (06 Jun 2021 08:17)  POCT Blood Glucose.: 127 mg/dL (05 Jun 2021 22:11)    Urinalysis Basic - ( 05 Jun 2021 16:02 )    Color: x / Appearance: x / SG: x / pH: x  Gluc: x / Ketone: x  / Bili: x / Urobili: x   Blood: x / Protein: x / Nitrite: x   Leuk Esterase: x / RBC: < 5 /HPF / WBC 5-10 /HPF   Sq Epi: x / Non Sq Epi: 0-5 /HPF / Bacteria: Present /HPF        MICRODATA:    Culture - Blood (collected 05 Jun 2021 20:09)  Source: .Blood Blood  Preliminary Report (06 Jun 2021 21:01):    No growth at 1 day.    Culture - Blood (collected 05 Jun 2021 20:09)  Source: .Blood Blood  Preliminary Report (06 Jun 2021 21:01):    No growth at 1 day.        RADIOLOGY/OTHER STUDIES:   Patient is a 66y old  Male who presents with a chief complaint of   INTERVAL EVENTS:  - Had difficulty falling asleep last night  - Had flatus overnight. Has not noticed any flatus today (time of eval around noon)    SUBJECTIVE:  - Abdomen discomfort similar to yesterday. Not worsened  - Less tenderness to deep palpation of abdomen   - Abdomen remains distended.   Rest of 12 point review of systems negative unless documented otherwise in note.    MEDICATIONS:  MEDICATIONS  (STANDING):  benzocaine 15 mG/menthol 3.6 mG Lozenge 1 Lozenge Oral three times a day  dextrose 40% Gel 15 Gram(s) Oral once  dextrose 5%. 1000 milliLiter(s) (50 mL/Hr) IV Continuous <Continuous>  dextrose 5%. 1000 milliLiter(s) (100 mL/Hr) IV Continuous <Continuous>  dextrose 50% Injectable 25 Gram(s) IV Push once  dextrose 50% Injectable 12.5 Gram(s) IV Push once  dextrose 50% Injectable 25 Gram(s) IV Push once  glucagon  Injectable 1 milliGRAM(s) IntraMuscular once  heparin   Injectable 5000 Unit(s) SubCutaneous every 8 hours  insulin lispro (ADMELOG) corrective regimen sliding scale   SubCutaneous Before meals and at bedtime  lactated ringers. 1000 milliLiter(s) (130 mL/Hr) IV Continuous <Continuous>  levothyroxine Injectable 40 MICROGram(s) IV Push <User Schedule>  metoprolol tartrate Injectable 5 milliGRAM(s) IV Push every 6 hours  pantoprazole  Injectable 40 milliGRAM(s) IV Push every 12 hours  valACYclovir 500 milliGRAM(s) Oral daily    MEDICATIONS  (PRN):  ondansetron Injectable 4 milliGRAM(s) IV Push once PRN Nausea and/or Vomiting      Allergies    sulfa drugs (Anaphylaxis)    Intolerances        OBJECTIVE:  Vital Signs Last 24 Hrs  T(C): 38.3 (06 Jun 2021 20:49), Max: 38.3 (06 Jun 2021 20:49)  T(F): 101 (06 Jun 2021 20:49), Max: 101 (06 Jun 2021 20:49)  HR: 74 (06 Jun 2021 20:49) (67 - 84)  BP: 153/82 (06 Jun 2021 20:49) (130/76 - 153/82)  BP(mean): --  RR: 17 (06 Jun 2021 20:49) (16 - 17)  SpO2: 95% (06 Jun 2021 20:49) (94% - 95%)  I&O's Summary    05 Jun 2021 07:01  -  06 Jun 2021 07:00  --------------------------------------------------------  IN: 2920 mL / OUT: 2000 mL / NET: 920 mL        PHYSICAL EXAM:  Gen: Sitting in bed at time of exam; in no acute distress  HEENT: NCAT, MMM, clear OP, NG tube in place, draining fluid.   Neck: supple, trachea at midline  CV: RRR, +S1/S2  Pulm: adequate respiratory effort, no increase in work of breathing  Abd: soft, distended.  mild-moderate tenderness to palpation in periumbilical area  Skin: warm and dry, no new rashes vs prior report  Ext: WWP, no LE edema  Neuro: AOx3, no gross focal neurological deficits  Psych: affect and behavior appropriate, pleasant      LABS:                        14.1   9.51  )-----------( 213      ( 06 Jun 2021 07:03 )             41.5     06-06    135  |  100  |  12  ----------------------------<  136<H>  4.1   |  22  |  0.98    Ca    8.6      06 Jun 2021 07:03  Phos  2.4     06-06  Mg     1.8     06-06          CAPILLARY BLOOD GLUCOSE      POCT Blood Glucose.: 116 mg/dL (06 Jun 2021 16:20)  POCT Blood Glucose.: 121 mg/dL (06 Jun 2021 13:17)  POCT Blood Glucose.: 132 mg/dL (06 Jun 2021 08:17)  POCT Blood Glucose.: 127 mg/dL (05 Jun 2021 22:11)    Urinalysis Basic - ( 05 Jun 2021 16:02 )    Color: x / Appearance: x / SG: x / pH: x  Gluc: x / Ketone: x  / Bili: x / Urobili: x   Blood: x / Protein: x / Nitrite: x   Leuk Esterase: x / RBC: < 5 /HPF / WBC 5-10 /HPF   Sq Epi: x / Non Sq Epi: 0-5 /HPF / Bacteria: Present /HPF        MICRODATA:    Culture - Blood (collected 05 Jun 2021 20:09)  Source: .Blood Blood  Preliminary Report (06 Jun 2021 21:01):    No growth at 1 day.    Culture - Blood (collected 05 Jun 2021 20:09)  Source: .Blood Blood  Preliminary Report (06 Jun 2021 21:01):    No growth at 1 day.        RADIOLOGY/OTHER STUDIES:

## 2021-06-06 NOTE — PROGRESS NOTE ADULT - ASSESSMENT
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     #Pre-op: Pre-op medical evaluation per Friday's consult note: can perform >10 METS, EKG reviewed - q waves indicative of prior MI, CAD is stable, RCRI class II risk (class III risk if high risk surgery), patient is optimized, can proceed to surgery if needed, c/w metoprolol, hold ACEi day of surgery if going  #SBO: care per primary team  #HIV: c/w ART, check CD4 and VL (can order patient's home ART, nevirapine 400mg qd and emtricitabine-tenofovir 200mg-300mg whenever primary team comfortable)   #CAD: c/w metoprolol, c/w statin, on ASA at home - held for now  #Hypothyroidism: c/w synthroid  #DM: A1C 7.3, was on Metformin at home but discontinued 2/2 GI side effects, c/w SSI for now  #Hypertriglyceridemia: c/w Vascepa  #HTN: c/w metoprolol, hold ACEi day of surgery if going, likely component of pain,  #Anal cancedr  #Vocal cord cancer  #DVT ppx: HSQ   66 year old male with HIV on ART, CAD w/ MI s/p PCI x3 in 2000, HTN, NIDDM, GERD, HLD, hypothyroidism, herpes, anal cancer in 1998 (s/p chemo-radiation), 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     #Pre-op: Pre-op medical evaluation per Friday's consult note: can perform >10 METS, EKG reviewed - q waves indicative of prior MI, CAD is stable, RCRI class II risk (class III risk if high risk surgery), patient is optimized, can proceed to surgery if needed, c/w metoprolol, hold ACEi day of surgery if going  #SBO: care per primary team  #HIV: c/w ART, check CD4 and VL (can order patient's home ART, nevirapine 400mg qd and emtricitabine-tenofovir 200mg-300mg whenever primary team comfortable)   #CAD: c/w metoprolol, c/w statin, on ASA at home - held for now  #Hypothyroidism: c/w synthroid  #DM: A1C 7.3, was on Metformin at home but discontinued 2/2 GI side effects, c/w SSI for now  #Hypertriglyceridemia: c/w Vascepa  #HTN: c/w metoprolol, hold ACEi day of surgery if going, likely component of pain,  #Anal cancedr  #Vocal cord cancer    # Sleep disturbance --- reports difficulty falling asleep last night - recommend trial of melatonin at 10pm tonight   #DVT ppx: HSQ

## 2021-06-07 LAB
4/8 RATIO: 0.44 RATIO — LOW (ref 0.86–4.14)
ABS CD8: 788 /UL — HIGH (ref 90–775)
ANION GAP SERPL CALC-SCNC: 17 MMOL/L — SIGNIFICANT CHANGE UP (ref 5–17)
BUN SERPL-MCNC: 10 MG/DL — SIGNIFICANT CHANGE UP (ref 7–23)
CALCIUM SERPL-MCNC: 8.8 MG/DL — SIGNIFICANT CHANGE UP (ref 8.4–10.5)
CD3 BLASTS SPEC-ACNC: 1167 /UL — SIGNIFICANT CHANGE UP (ref 396–2024)
CD3 BLASTS SPEC-ACNC: 86 % — HIGH (ref 58–84)
CD4 %: 26 % — LOW (ref 30–56)
CD8 %: 58 % — HIGH (ref 11–43)
CHLORIDE SERPL-SCNC: 100 MMOL/L — SIGNIFICANT CHANGE UP (ref 96–108)
CO2 SERPL-SCNC: 21 MMOL/L — LOW (ref 22–31)
CREAT SERPL-MCNC: 0.86 MG/DL — SIGNIFICANT CHANGE UP (ref 0.5–1.3)
GLUCOSE BLDC GLUCOMTR-MCNC: 123 MG/DL — HIGH (ref 70–99)
GLUCOSE BLDC GLUCOMTR-MCNC: 127 MG/DL — HIGH (ref 70–99)
GLUCOSE BLDC GLUCOMTR-MCNC: 143 MG/DL — HIGH (ref 70–99)
GLUCOSE BLDC GLUCOMTR-MCNC: 154 MG/DL — HIGH (ref 70–99)
GLUCOSE SERPL-MCNC: 130 MG/DL — HIGH (ref 70–99)
HCT VFR BLD CALC: 38.5 % — LOW (ref 39–50)
HGB BLD-MCNC: 13.2 G/DL — SIGNIFICANT CHANGE UP (ref 13–17)
MAGNESIUM SERPL-MCNC: 1.8 MG/DL — SIGNIFICANT CHANGE UP (ref 1.6–2.6)
MCHC RBC-ENTMCNC: 31.7 PG — SIGNIFICANT CHANGE UP (ref 27–34)
MCHC RBC-ENTMCNC: 34.3 GM/DL — SIGNIFICANT CHANGE UP (ref 32–36)
MCV RBC AUTO: 92.3 FL — SIGNIFICANT CHANGE UP (ref 80–100)
NRBC # BLD: 0 /100 WBCS — SIGNIFICANT CHANGE UP (ref 0–0)
PHOSPHATE SERPL-MCNC: 2.4 MG/DL — LOW (ref 2.5–4.5)
PLATELET # BLD AUTO: 211 K/UL — SIGNIFICANT CHANGE UP (ref 150–400)
POTASSIUM SERPL-MCNC: 3.9 MMOL/L — SIGNIFICANT CHANGE UP (ref 3.5–5.3)
POTASSIUM SERPL-SCNC: 3.9 MMOL/L — SIGNIFICANT CHANGE UP (ref 3.5–5.3)
RBC # BLD: 4.17 M/UL — LOW (ref 4.2–5.8)
RBC # FLD: 12.8 % — SIGNIFICANT CHANGE UP (ref 10.3–14.5)
SARS-COV-2 RNA SPEC QL NAA+PROBE: SIGNIFICANT CHANGE UP
SODIUM SERPL-SCNC: 138 MMOL/L — SIGNIFICANT CHANGE UP (ref 135–145)
T-CELL CD4 SUBSET PNL BLD: 349 /UL — SIGNIFICANT CHANGE UP (ref 325–1251)
WBC # BLD: 9.29 K/UL — SIGNIFICANT CHANGE UP (ref 3.8–10.5)
WBC # FLD AUTO: 9.29 K/UL — SIGNIFICANT CHANGE UP (ref 3.8–10.5)

## 2021-06-07 PROCEDURE — 74177 CT ABD & PELVIS W/CONTRAST: CPT | Mod: 26

## 2021-06-07 PROCEDURE — 74018 RADEX ABDOMEN 1 VIEW: CPT | Mod: 26

## 2021-06-07 PROCEDURE — 99253 IP/OBS CNSLTJ NEW/EST LOW 45: CPT

## 2021-06-07 PROCEDURE — 99233 SBSQ HOSP IP/OBS HIGH 50: CPT | Mod: GC

## 2021-06-07 RX ORDER — ACETAMINOPHEN 500 MG
1000 TABLET ORAL ONCE
Refills: 0 | Status: COMPLETED | OUTPATIENT
Start: 2021-06-07 | End: 2021-06-07

## 2021-06-07 RX ORDER — DIPHENHYDRAMINE HCL 50 MG
25 CAPSULE ORAL ONCE
Refills: 0 | Status: COMPLETED | OUTPATIENT
Start: 2021-06-07 | End: 2021-06-07

## 2021-06-07 RX ORDER — SODIUM CHLORIDE 9 MG/ML
1000 INJECTION, SOLUTION INTRAVENOUS
Refills: 0 | Status: DISCONTINUED | OUTPATIENT
Start: 2021-06-07 | End: 2021-06-09

## 2021-06-07 RX ORDER — IOHEXOL 300 MG/ML
30 INJECTION, SOLUTION INTRAVENOUS ONCE
Refills: 0 | Status: COMPLETED | OUTPATIENT
Start: 2021-06-07 | End: 2021-06-07

## 2021-06-07 RX ORDER — MAGNESIUM SULFATE 500 MG/ML
1 VIAL (ML) INJECTION ONCE
Refills: 0 | Status: COMPLETED | OUTPATIENT
Start: 2021-06-07 | End: 2021-06-07

## 2021-06-07 RX ADMIN — HEPARIN SODIUM 5000 UNIT(S): 5000 INJECTION INTRAVENOUS; SUBCUTANEOUS at 22:17

## 2021-06-07 RX ADMIN — VALACYCLOVIR 500 MILLIGRAM(S): 500 TABLET, FILM COATED ORAL at 11:14

## 2021-06-07 RX ADMIN — Medication 400 MILLIGRAM(S): at 09:15

## 2021-06-07 RX ADMIN — Medication 25 MILLIGRAM(S): at 01:23

## 2021-06-07 RX ADMIN — Medication 25 MILLIGRAM(S): at 22:18

## 2021-06-07 RX ADMIN — Medication 1000 MILLIGRAM(S): at 09:30

## 2021-06-07 RX ADMIN — PANTOPRAZOLE SODIUM 40 MILLIGRAM(S): 20 TABLET, DELAYED RELEASE ORAL at 05:28

## 2021-06-07 RX ADMIN — Medication 5 MILLIGRAM(S): at 11:13

## 2021-06-07 RX ADMIN — Medication 1: at 22:17

## 2021-06-07 RX ADMIN — Medication 62.5 MILLIMOLE(S): at 11:14

## 2021-06-07 RX ADMIN — IOHEXOL 30 MILLILITER(S): 300 INJECTION, SOLUTION INTRAVENOUS at 09:14

## 2021-06-07 RX ADMIN — HEPARIN SODIUM 5000 UNIT(S): 5000 INJECTION INTRAVENOUS; SUBCUTANEOUS at 13:09

## 2021-06-07 RX ADMIN — PANTOPRAZOLE SODIUM 40 MILLIGRAM(S): 20 TABLET, DELAYED RELEASE ORAL at 17:30

## 2021-06-07 RX ADMIN — Medication 5 MILLIGRAM(S): at 00:07

## 2021-06-07 RX ADMIN — Medication 5 MILLIGRAM(S): at 05:41

## 2021-06-07 RX ADMIN — HEPARIN SODIUM 5000 UNIT(S): 5000 INJECTION INTRAVENOUS; SUBCUTANEOUS at 05:28

## 2021-06-07 RX ADMIN — Medication 5 MILLIGRAM(S): at 17:30

## 2021-06-07 RX ADMIN — Medication 5 MILLIGRAM(S): at 23:28

## 2021-06-07 RX ADMIN — Medication 100 GRAM(S): at 09:38

## 2021-06-07 RX ADMIN — Medication 40 MICROGRAM(S): at 09:44

## 2021-06-07 NOTE — PROGRESS NOTE ADULT - SUBJECTIVE AND OBJECTIVE BOX
SUBJECTIVE: Feeling ok , ambulating, no N/V, flatus x1, no BM.  Patient seen and examined bedside by chief resident.    heparin   Injectable 5000 Unit(s) SubCutaneous every 8 hours  metoprolol tartrate Injectable 5 milliGRAM(s) IV Push every 6 hours  valACYclovir 500 milliGRAM(s) Oral daily    MEDICATIONS  (PRN):  ondansetron Injectable 4 milliGRAM(s) IV Push once PRN Nausea and/or Vomiting      I&O's Detail    06 Jun 2021 07:01  -  07 Jun 2021 07:00  --------------------------------------------------------  IN:    Lactated Ringers: 1430 mL  Total IN: 1430 mL    OUT:    Nasogastric/Oral tube (mL): 100 mL    Oral Fluid: 0 mL    Voided (mL): 1750 mL  Total OUT: 1850 mL    Total NET: -420 mL      07 Jun 2021 07:01  -  07 Jun 2021 08:21  --------------------------------------------------------  IN:  Total IN: 0 mL    OUT:    Voided (mL): 300 mL  Total OUT: 300 mL    Total NET: -300 mL          Vital Signs Last 24 Hrs  T(C): 37.1 (07 Jun 2021 05:38), Max: 38.3 (06 Jun 2021 20:49)  T(F): 98.8 (07 Jun 2021 05:38), Max: 101 (06 Jun 2021 20:49)  HR: 69 (07 Jun 2021 05:38) (69 - 75)  BP: 148/80 (07 Jun 2021 05:38) (130/76 - 153/82)  BP(mean): --  RR: 17 (07 Jun 2021 05:38) (16 - 17)  SpO2: 94% (07 Jun 2021 05:38) (94% - 95%)    General: NAD, resting comfortably in bed  C/V: NSR  Pulm: Nonlabored breathing, no respiratory distress  Abd: softly distended, slight TTP epigastrum  Extrem: SCDs in place    LABS:                        13.2   9.29  )-----------( 211      ( 07 Jun 2021 06:40 )             38.5     06-07    138  |  100  |  10  ----------------------------<  130<H>  3.9   |  21<L>  |  0.86    Ca    8.8      07 Jun 2021 06:40  Phos  2.4     06-07  Mg     1.8     06-07        Urinalysis Basic - ( 05 Jun 2021 16:02 )    Color: x / Appearance: x / SG: x / pH: x  Gluc: x / Ketone: x  / Bili: x / Urobili: x   Blood: x / Protein: x / Nitrite: x   Leuk Esterase: x / RBC: < 5 /HPF / WBC 5-10 /HPF   Sq Epi: x / Non Sq Epi: 0-5 /HPF / Bacteria: Present /HPF

## 2021-06-07 NOTE — CHART NOTE - NSCHARTNOTEFT_GEN_A_CORE
***KEHINDE***    Patient reports feeling less abdominal pain comparing to when ge presented to [facility], no nausea or vomit, had a large BM today. passing flatus. OOBA, feels hungry.     General Aspect: NAD with NGT in place, alert and responsive  Abd: mild distention, tender to deep palpation to periumbilical area, no guarding or rebound.     Plan:   Small bowel series 6/8  KUB   Monitoring bowel function  NGT  stimulate OOBA

## 2021-06-07 NOTE — PROGRESS NOTE ADULT - SUBJECTIVE AND OBJECTIVE BOX
Patient is a 66y old  Male who presents with a chief complaint of Small bowel obstruction (06 Jun 2021 21:24)      INTERVAL HPI/OVERNIGHT EVENTS:  Patient was seen and examined at bedside. As per nurse and patient, no o/n events. With persistent abdominal pain and distention, passing flatus at this time. For repeat CT scan today. Patient denies: fever, chills, dizziness, weakness, HA, Changes in vision, CP, palpitations, SOB, cough, dysuria, changes in bowel movements, LE edema.      PAST MEDICAL & SURGICAL HISTORY:  Human immunodeficiency virus (HIV) infection    HTN (hypertension)    DM (diabetes mellitus)    Vocal cord cancer    Chronic GERD    HLD (hyperlipidemia)    History of herpes zoster with AIDS    Heart attack    History of anal cancer    S/P radiotherapy    No significant past surgical history      T(C): 37.1 (06-07-21 @ 14:18), Max: 38.3 (06-06-21 @ 20:49)  HR: 78 (06-07-21 @ 14:18) (69 - 80)  BP: 146/91 (06-07-21 @ 14:18) (133/78 - 153/82)  RR: 18 (06-07-21 @ 14:18) (17 - 18)  SpO2: 94% (06-07-21 @ 14:18) (94% - 95%)  Wt(kg): --  I&O's Summary    06 Jun 2021 07:01  -  07 Jun 2021 07:00  --------------------------------------------------------  IN: 1430 mL / OUT: 1850 mL / NET: -420 mL    07 Jun 2021 07:01  -  07 Jun 2021 15:08  --------------------------------------------------------  IN: 777.5 mL / OUT: 700 mL / NET: 77.5 mL        PHYSICAL EXAM:  GENERAL: NAD, laying comfortably in bed  HEAD:  Atraumatic, Normocephalic  EYES: EOMI, PERRLA, conjunctiva and sclera clear  ENMT: No tonsillar erythema, exudates, or enlargement; MMM  NECK: Supple, No JVD  NERVOUS SYSTEM:  Alert & Oriented X3, no focal deficits   CHEST/LUNG: Clear to percussion bilaterally; No rales, rhonchi, wheezing, or rubs  HEART: Regular rate and rhythm; No murmurs, rubs, or gallops  ABDOMEN: Soft, +tenderness diffusely worse in the epigastrum and umbilical region, +distention  EXTREMITIES:  2+ Peripheral Pulses, No clubbing, cyanosis, or edema  LYMPH: No lymphadenopathy noted  SKIN: No rashes or lesions        LABS:                        13.2   9.29  )-----------( 211      ( 07 Jun 2021 06:40 )             38.5     06-07    138  |  100  |  10  ----------------------------<  130<H>  3.9   |  21<L>  |  0.86    Ca    8.8      07 Jun 2021 06:40  Phos  2.4     06-07  Mg     1.8     06-07        Urinalysis Basic - ( 05 Jun 2021 16:02 )    Color: x / Appearance: x / SG: x / pH: x  Gluc: x / Ketone: x  / Bili: x / Urobili: x   Blood: x / Protein: x / Nitrite: x   Leuk Esterase: x / RBC: < 5 /HPF / WBC 5-10 /HPF   Sq Epi: x / Non Sq Epi: 0-5 /HPF / Bacteria: Present /HPF      CAPILLARY BLOOD GLUCOSE      POCT Blood Glucose.: 143 mg/dL (07 Jun 2021 14:02)  POCT Blood Glucose.: 123 mg/dL (07 Jun 2021 07:46)  POCT Blood Glucose.: 122 mg/dL (06 Jun 2021 21:52)  POCT Blood Glucose.: 116 mg/dL (06 Jun 2021 16:20)        Urinalysis Basic - ( 05 Jun 2021 16:02 )    Color: x / Appearance: x / SG: x / pH: x  Gluc: x / Ketone: x  / Bili: x / Urobili: x   Blood: x / Protein: x / Nitrite: x   Leuk Esterase: x / RBC: < 5 /HPF / WBC 5-10 /HPF   Sq Epi: x / Non Sq Epi: 0-5 /HPF / Bacteria: Present /HPF        MEDICATIONS  (STANDING):  benzocaine 15 mG/menthol 3.6 mG Lozenge 1 Lozenge Oral three times a day  dextrose 40% Gel 15 Gram(s) Oral once  dextrose 5% + sodium chloride 0.45%. 1000 milliLiter(s) (110 mL/Hr) IV Continuous <Continuous>  dextrose 5%. 1000 milliLiter(s) (50 mL/Hr) IV Continuous <Continuous>  dextrose 5%. 1000 milliLiter(s) (100 mL/Hr) IV Continuous <Continuous>  dextrose 50% Injectable 25 Gram(s) IV Push once  dextrose 50% Injectable 12.5 Gram(s) IV Push once  dextrose 50% Injectable 25 Gram(s) IV Push once  glucagon  Injectable 1 milliGRAM(s) IntraMuscular once  heparin   Injectable 5000 Unit(s) SubCutaneous every 8 hours  insulin lispro (ADMELOG) corrective regimen sliding scale   SubCutaneous Before meals and at bedtime  levothyroxine Injectable 40 MICROGram(s) IV Push <User Schedule>  metoprolol tartrate Injectable 5 milliGRAM(s) IV Push every 6 hours  pantoprazole  Injectable 40 milliGRAM(s) IV Push every 12 hours  valACYclovir 500 milliGRAM(s) Oral daily    MEDICATIONS  (PRN):  ondansetron Injectable 4 milliGRAM(s) IV Push once PRN Nausea and/or Vomiting      RADIOLOGY & ADDITIONAL TESTS:    Imaging Personally Reviewed:  [ ] YES  [ ] NO    Consultant(s) Notes Reviewed:  [ ] YES  [ ] NO    Care Discussed with Consultants/Other Providers [ ] YES  [ ] NO

## 2021-06-07 NOTE — PROGRESS NOTE ADULT - ASSESSMENT
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 Presenting with abdominal pain and nausea. Last BF 2 days before admission ago, CT with evidence of SBO.     NPO/IVF  NGT to LIWS  Valacyclovir oral   Pain/nausea control (avoid narcotics)  OOBA/SCD/IS/SQH  KEHINDE  AM labs  CT AP  consult HIV

## 2021-06-07 NOTE — CHART NOTE - NSCHARTNOTEFT_GEN_A_CORE
HIV Consult Note  Admission H&P, Progress Notes and Consultant Notes reviewed by me in detail.    CC:  Patient is a 66y old  Male who presents with a chief complaint of Small bowel obstruction (06 Jun 2021 21:24)  Patient dx w/ HIV in late 1980s via sexual encounter.    On numerous regimens, most recently Truvada / Nevirapine and has been UD for approx 10 years w/ good CD4.  Has intermittent fevers, occasionally, once every 2-3 months - no source ever found.  Was having fevers more frequently during this admission for SBO but w/out localizing symptoms.  Has had no adverse effects to current med regimen.  Last dose was Thursday, just before admission.  Reports strict 100% adherence.    +Abdominal distension/tenderness, now passing flatulence.  12 pt ROS otherwise negative.    PAST MEDICAL & SURGICAL HISTORY:  Human immunodeficiency virus (HIV) infection  HTN (hypertension)  DM (diabetes mellitus)  Vocal cord cancer  Chronic GERD  HLD (hyperlipidemia)  History of herpes zoster with AIDS  Heart attack  History of anal cancer  S/P radiotherapy    No significant past surgical history    FHx: Non-contributory    HIV History:  Outpatient HIV Provider: Dr. Nghia Kingston  Year of HIV Diagnosis: 1980s (late)  T cell nati:   Highest Viral Load:   Current ARV regimen: Nevirapine + Truvada  ARV adherence: Optimal  Previous ARV regimens: Multiple  Hx of Past Opportunistic Infections: None reported    MEDICATIONS  (STANDING):  benzocaine 15 mG/menthol 3.6 mG Lozenge 1 Lozenge Oral three times a day  dextrose 40% Gel 15 Gram(s) Oral once  dextrose 5% + sodium chloride 0.45%. 1000 milliLiter(s) (110 mL/Hr) IV Continuous <Continuous>  dextrose 5%. 1000 milliLiter(s) (50 mL/Hr) IV Continuous <Continuous>  dextrose 5%. 1000 milliLiter(s) (100 mL/Hr) IV Continuous <Continuous>  dextrose 50% Injectable 25 Gram(s) IV Push once  dextrose 50% Injectable 12.5 Gram(s) IV Push once  dextrose 50% Injectable 25 Gram(s) IV Push once  glucagon  Injectable 1 milliGRAM(s) IntraMuscular once  heparin   Injectable 5000 Unit(s) SubCutaneous every 8 hours  insulin lispro (ADMELOG) corrective regimen sliding scale   SubCutaneous Before meals and at bedtime  levothyroxine Injectable 40 MICROGram(s) IV Push <User Schedule>  metoprolol tartrate Injectable 5 milliGRAM(s) IV Push every 6 hours  pantoprazole  Injectable 40 milliGRAM(s) IV Push every 12 hours  valACYclovir 500 milliGRAM(s) Oral daily    MEDICATIONS  (PRN):  ondansetron Injectable 4 milliGRAM(s) IV Push once PRN Nausea and/or Vomiting      Allergies  sulfa drugs (Anaphylaxis)    PHYSICAL EXAM  General: A&Ox3; NAD  Head: NC/AT; MMM; PERRL; EOMI; NG tube in place  Neck: Supple; no JVD; no LAD palpated  Respiratory: CTA B/L; no wheezes/crackles   Cardiovascular: Regular rhythm/rate; S1/S2   Gastrointestinal: Mild distension w/ tenderness to palpation, hypoactive BS  Extremities: WWP; no edema/cyanosis  Neurological:  CNII-XII grossly intact; no obvious focal deficits    LABS:                         13.2   9.29  )-----------( 211      ( 07 Jun 2021 06:40 )             38.5     06-07    138  |  100  |  10  ----------------------------<  130<H>  3.9   |  21<L>  |  0.86    Ca    8.8      07 Jun 2021 06:40  Phos  2.4     06-07  Mg     1.8     06-07    Urinalysis Basic - ( 05 Jun 2021 16:02 )    Color: x / Appearance: x / SG: x / pH: x  Gluc: x / Ketone: x  / Bili: x / Urobili: x   Blood: x / Protein: x / Nitrite: x   Leuk Esterase: x / RBC: < 5 /HPF / WBC 5-10 /HPF   Sq Epi: x / Non Sq Epi: 0-5 /HPF / Bacteria: Present /HPF    Microbiology/Cultures: Reviewed  Images/EKG/etc: Reviewed    Assessment/Recommendation:  66M w/ hx of malignancy and HIV presents w/ SBO undergoing conservative tx complicated by recurrent fevers.    HIV: Patient reports being well controlled.  Abs lymphocyte count normal, no protein gap.  Patient is likely undetectable w/ good CD4.  He has good follow up w/ specialist and has no adverse effects to current regimen.  CD4 and VL officially pending.  Older regimen, patient may benefit from new medications but unknown resistances and regimen history at this time.  Can restart home meds: Truvada and Nevirapine when able to tolerate PO.  Patient to follow up w/ specialist provider - Dr. Tung Kingston.    Fevers: Unlikely to be HIV/OI related.  He has been well controlled for a long time.  He reports long hx of intermittent fevers that his specialist has tried to w/u w/out result.  Current fevers may be due to inflammatory process/SBO vs translocation of gut bacteria.  No evidence of infection elsewhere on exam.  UA okay, UCx pending.  BCx NGTD.  Monitor for signs/evidence of abdominal infection.      Hx of Herpes: C/w valacyclovir.    Provided HIV infection and treatment education to patient  Counseled patient on risks/benefits of treatment and non-adherence    Recommendations discussed w/ Team 4 surgery    HIV to sign off.  Please re-consult w/ additional questions.

## 2021-06-07 NOTE — PROGRESS NOTE ADULT - ASSESSMENT
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     #SBO: plan per primary team, for repeat CT scan today for eval  #Pre-op: Pre-op medical evaluation per Friday's consult note: can perform >10 METS, EKG reviewed - q waves indicative of prior MI, CAD is stable, RCRI class II risk (class III risk if high risk surgery), patient is optimized, can proceed to surgery if needed, c/w metoprolol, hold ACEi day of surgery if going  #HIV: c/w ART, HIV team consulted, appreciate recs  #CAD: c/w metoprolol, c/w statin and home ASA once diet is advanced  #Hypothyroidism: c/w synthroid  #DM: A1C 7.3, was on Metformin at home but discontinued 2/2 GI side effects, c/w SSI for now  #Hypertriglyceridemia: c/w Vascepa  #HTN: c/w metoprolol, hold ACEi day of surgery if going, likely component of pain,  #Anal cancer  #Vocal cord cancer  # Sleep disturbance --- reports difficulty falling asleep at night - recommend trial of melatonin   #DVT ppx: HSQ

## 2021-06-08 LAB
ANION GAP SERPL CALC-SCNC: 13 MMOL/L — SIGNIFICANT CHANGE UP (ref 5–17)
APTT BLD: 32.7 SEC — SIGNIFICANT CHANGE UP (ref 27.5–35.5)
BLD GP AB SCN SERPL QL: NEGATIVE — SIGNIFICANT CHANGE UP
BUN SERPL-MCNC: 8 MG/DL — SIGNIFICANT CHANGE UP (ref 7–23)
CALCIUM SERPL-MCNC: 8.8 MG/DL — SIGNIFICANT CHANGE UP (ref 8.4–10.5)
CHLORIDE SERPL-SCNC: 100 MMOL/L — SIGNIFICANT CHANGE UP (ref 96–108)
CO2 SERPL-SCNC: 23 MMOL/L — SIGNIFICANT CHANGE UP (ref 22–31)
CREAT SERPL-MCNC: 0.89 MG/DL — SIGNIFICANT CHANGE UP (ref 0.5–1.3)
GLUCOSE BLDC GLUCOMTR-MCNC: 160 MG/DL — HIGH (ref 70–99)
GLUCOSE BLDC GLUCOMTR-MCNC: 162 MG/DL — HIGH (ref 70–99)
GLUCOSE BLDC GLUCOMTR-MCNC: 163 MG/DL — HIGH (ref 70–99)
GLUCOSE BLDC GLUCOMTR-MCNC: 174 MG/DL — HIGH (ref 70–99)
GLUCOSE SERPL-MCNC: 173 MG/DL — HIGH (ref 70–99)
HCT VFR BLD CALC: 38.3 % — LOW (ref 39–50)
HGB BLD-MCNC: 13.2 G/DL — SIGNIFICANT CHANGE UP (ref 13–17)
HIV-1 VIRAL LOAD RESULT: SIGNIFICANT CHANGE UP
HIV1 RNA # SERPL NAA+PROBE: SIGNIFICANT CHANGE UP
HIV1 RNA SER-IMP: SIGNIFICANT CHANGE UP
HIV1 RNA SERPL NAA+PROBE-ACNC: SIGNIFICANT CHANGE UP
HIV1 RNA SERPL NAA+PROBE-LOG#: SIGNIFICANT CHANGE UP LG COP/ML
INR BLD: 1.14 — SIGNIFICANT CHANGE UP (ref 0.88–1.16)
MAGNESIUM SERPL-MCNC: 2 MG/DL — SIGNIFICANT CHANGE UP (ref 1.6–2.6)
MCHC RBC-ENTMCNC: 31.4 PG — SIGNIFICANT CHANGE UP (ref 27–34)
MCHC RBC-ENTMCNC: 34.5 GM/DL — SIGNIFICANT CHANGE UP (ref 32–36)
MCV RBC AUTO: 91.2 FL — SIGNIFICANT CHANGE UP (ref 80–100)
NRBC # BLD: 0 /100 WBCS — SIGNIFICANT CHANGE UP (ref 0–0)
PHOSPHATE SERPL-MCNC: 2.6 MG/DL — SIGNIFICANT CHANGE UP (ref 2.5–4.5)
PLATELET # BLD AUTO: 223 K/UL — SIGNIFICANT CHANGE UP (ref 150–400)
POTASSIUM SERPL-MCNC: 3.6 MMOL/L — SIGNIFICANT CHANGE UP (ref 3.5–5.3)
POTASSIUM SERPL-SCNC: 3.6 MMOL/L — SIGNIFICANT CHANGE UP (ref 3.5–5.3)
PROTHROM AB SERPL-ACNC: 13.6 SEC — SIGNIFICANT CHANGE UP (ref 10.6–13.6)
RBC # BLD: 4.2 M/UL — SIGNIFICANT CHANGE UP (ref 4.2–5.8)
RBC # FLD: 12.8 % — SIGNIFICANT CHANGE UP (ref 10.3–14.5)
RH IG SCN BLD-IMP: POSITIVE — SIGNIFICANT CHANGE UP
SODIUM SERPL-SCNC: 136 MMOL/L — SIGNIFICANT CHANGE UP (ref 135–145)
WBC # BLD: 7.02 K/UL — SIGNIFICANT CHANGE UP (ref 3.8–10.5)
WBC # FLD AUTO: 7.02 K/UL — SIGNIFICANT CHANGE UP (ref 3.8–10.5)

## 2021-06-08 PROCEDURE — 99232 SBSQ HOSP IP/OBS MODERATE 35: CPT | Mod: GC

## 2021-06-08 PROCEDURE — 74250 X-RAY XM SM INT 1CNTRST STD: CPT | Mod: 26

## 2021-06-08 RX ORDER — VALACYCLOVIR 500 MG/1
1 TABLET, FILM COATED ORAL
Qty: 0 | Refills: 0 | DISCHARGE

## 2021-06-08 RX ORDER — ATORVASTATIN CALCIUM 80 MG/1
80 TABLET, FILM COATED ORAL AT BEDTIME
Refills: 0 | Status: DISCONTINUED | OUTPATIENT
Start: 2021-06-08 | End: 2021-06-10

## 2021-06-08 RX ORDER — ROSUVASTATIN CALCIUM 5 MG/1
1 TABLET ORAL
Qty: 0 | Refills: 0 | DISCHARGE

## 2021-06-08 RX ORDER — LEVOTHYROXINE SODIUM 125 MCG
50 TABLET ORAL DAILY
Refills: 0 | Status: DISCONTINUED | OUTPATIENT
Start: 2021-06-08 | End: 2021-06-10

## 2021-06-08 RX ORDER — LEVOTHYROXINE SODIUM 125 MCG
1 TABLET ORAL
Qty: 0 | Refills: 0 | DISCHARGE

## 2021-06-08 RX ORDER — OMEPRAZOLE 10 MG/1
1 CAPSULE, DELAYED RELEASE ORAL
Qty: 0 | Refills: 0 | DISCHARGE

## 2021-06-08 RX ORDER — LANOLIN ALCOHOL/MO/W.PET/CERES
5 CREAM (GRAM) TOPICAL AT BEDTIME
Refills: 0 | Status: DISCONTINUED | OUTPATIENT
Start: 2021-06-08 | End: 2021-06-10

## 2021-06-08 RX ORDER — METOPROLOL TARTRATE 50 MG
50 TABLET ORAL DAILY
Refills: 0 | Status: DISCONTINUED | OUTPATIENT
Start: 2021-06-08 | End: 2021-06-10

## 2021-06-08 RX ORDER — METOPROLOL TARTRATE 50 MG
1 TABLET ORAL
Qty: 0 | Refills: 0 | DISCHARGE

## 2021-06-08 RX ORDER — ACETAMINOPHEN 500 MG
1000 TABLET ORAL EVERY 6 HOURS
Refills: 0 | Status: DISCONTINUED | OUTPATIENT
Start: 2021-06-08 | End: 2021-06-10

## 2021-06-08 RX ORDER — FENOFIBRATE,MICRONIZED 130 MG
1 CAPSULE ORAL
Qty: 0 | Refills: 0 | DISCHARGE

## 2021-06-08 RX ORDER — PANTOPRAZOLE SODIUM 20 MG/1
40 TABLET, DELAYED RELEASE ORAL
Refills: 0 | Status: DISCONTINUED | OUTPATIENT
Start: 2021-06-08 | End: 2021-06-10

## 2021-06-08 RX ORDER — EMTRICITABINE AND TENOFOVIR DISOPROXIL FUMARATE 200; 300 MG/1; MG/1
1 TABLET, FILM COATED ORAL DAILY
Refills: 0 | Status: DISCONTINUED | OUTPATIENT
Start: 2021-06-08 | End: 2021-06-10

## 2021-06-08 RX ORDER — NEVIRAPINE 400 MG/1
1 TABLET, EXTENDED RELEASE ORAL
Qty: 0 | Refills: 0 | DISCHARGE

## 2021-06-08 RX ORDER — EMTRICITABINE AND TENOFOVIR DISOPROXIL FUMARATE 200; 300 MG/1; MG/1
1 TABLET, FILM COATED ORAL
Qty: 0 | Refills: 0 | DISCHARGE

## 2021-06-08 RX ADMIN — PANTOPRAZOLE SODIUM 40 MILLIGRAM(S): 20 TABLET, DELAYED RELEASE ORAL at 17:05

## 2021-06-08 RX ADMIN — HEPARIN SODIUM 5000 UNIT(S): 5000 INJECTION INTRAVENOUS; SUBCUTANEOUS at 21:20

## 2021-06-08 RX ADMIN — Medication 5 MILLIGRAM(S): at 13:06

## 2021-06-08 RX ADMIN — VALACYCLOVIR 500 MILLIGRAM(S): 500 TABLET, FILM COATED ORAL at 13:06

## 2021-06-08 RX ADMIN — HEPARIN SODIUM 5000 UNIT(S): 5000 INJECTION INTRAVENOUS; SUBCUTANEOUS at 05:41

## 2021-06-08 RX ADMIN — HEPARIN SODIUM 5000 UNIT(S): 5000 INJECTION INTRAVENOUS; SUBCUTANEOUS at 13:06

## 2021-06-08 RX ADMIN — Medication 1000 MILLIGRAM(S): at 21:23

## 2021-06-08 RX ADMIN — ATORVASTATIN CALCIUM 80 MILLIGRAM(S): 80 TABLET, FILM COATED ORAL at 21:20

## 2021-06-08 RX ADMIN — Medication 1: at 22:02

## 2021-06-08 RX ADMIN — Medication 1: at 14:34

## 2021-06-08 RX ADMIN — Medication 5 MILLIGRAM(S): at 17:05

## 2021-06-08 RX ADMIN — Medication 1: at 18:51

## 2021-06-08 RX ADMIN — Medication 40 MICROGRAM(S): at 13:06

## 2021-06-08 RX ADMIN — Medication 1000 MILLIGRAM(S): at 22:00

## 2021-06-08 RX ADMIN — Medication 5 MILLIGRAM(S): at 05:42

## 2021-06-08 RX ADMIN — EMTRICITABINE AND TENOFOVIR DISOPROXIL FUMARATE 1 TABLET(S): 200; 300 TABLET, FILM COATED ORAL at 21:20

## 2021-06-08 RX ADMIN — Medication 5 MILLIGRAM(S): at 23:12

## 2021-06-08 RX ADMIN — PANTOPRAZOLE SODIUM 40 MILLIGRAM(S): 20 TABLET, DELAYED RELEASE ORAL at 05:41

## 2021-06-08 NOTE — PROGRESS NOTE ADULT - SUBJECTIVE AND OBJECTIVE BOX
SUBJECTIVE: Feeling better, +flatus/BM yesterday no N/V, NG output clearing up. Patient seen and examined bedside by chief resident.    heparin   Injectable 5000 Unit(s) SubCutaneous every 8 hours  metoprolol tartrate Injectable 5 milliGRAM(s) IV Push every 6 hours  valACYclovir 500 milliGRAM(s) Oral daily    MEDICATIONS  (PRN):  ondansetron Injectable 4 milliGRAM(s) IV Push once PRN Nausea and/or Vomiting      I&O's Detail    07 Jun 2021 07:01  -  08 Jun 2021 07:00  --------------------------------------------------------  IN:    dextrose 5% + sodium chloride 0.45%: 440 mL    IV PiggyBack: 100 mL    IV PiggyBack: 350 mL    Lactated Ringers: 390 mL  Total IN: 1280 mL    OUT:    Nasogastric/Oral tube (mL): 350 mL    Oral Fluid: 0 mL    Voided (mL): 2000 mL  Total OUT: 2350 mL    Total NET: -1070 mL          Vital Signs Last 24 Hrs  T(C): 37.1 (08 Jun 2021 05:27), Max: 37.8 (07 Jun 2021 08:20)  T(F): 98.7 (08 Jun 2021 05:27), Max: 100.1 (07 Jun 2021 08:20)  HR: 74 (08 Jun 2021 05:33) (65 - 80)  BP: 158/73 (08 Jun 2021 05:33) (133/78 - 164/71)  BP(mean): --  RR: 18 (08 Jun 2021 05:33) (18 - 18)  SpO2: 96% (08 Jun 2021 05:33) (94% - 96%)    General: NAD, resting comfortably in bed  C/V: NSR  Pulm: Nonlabored breathing, no respiratory distress  Abd: softly distended, nontender  Extrem:  SCDs in place    LABS:                        13.2   9.29  )-----------( 211      ( 07 Jun 2021 06:40 )             38.5     06-07    138  |  100  |  10  ----------------------------<  130<H>  3.9   |  21<L>  |  0.86    Ca    8.8      07 Jun 2021 06:40  Phos  2.4     06-07  Mg     1.8     06-07            RADIOLOGY & ADDITIONAL STUDIES:  CT Abdomen and Pelvis w/ Oral Cont and w/ IV Cont:   EXAM:  CT ABDOMEN AND PELVIS OC IC                          PROCEDURE DATE:  06/07/2021          INTERPRETATION:  CT of the ABDOMEN and PELVIS with intravenous contrast dated 6/7/2021 12:43 PM    INDICATION: Small bowel obstruction. Nausea. Remote history of anal cancer post chemoradiotherapy 1998      TECHNIQUE: CT of the abdomen and pelvis with intravenous and oral contrast. Axial, sagittal, and coronal images were obtained and reviewed.    COMPARISON: CT abdomen pelvis June 4, 2021.    FINDINGS:    Lower chest: Clear lung bases.    Liver: Smooth in contour. No focal lesion. Patent portal and hepatic veins..    Biliary system: Gallbladder is normal in size. No calcified gallstones. No biliary ductal dilatation.    Pancreas: Unremarkable.    Spleen: Unremarkable.    Adrenal glands: Unremarkable.    Kidneys: Symmetric parenchymal enhancement. No renal mass. No hydronephrosis. No renal or ureteral stone.  Urinary Bladder: Unremarkable.    Reproductive organs: Unremarkable.    Bowel/Peritoneum: Thickened hyperenhancing collapsed loop of terminal/distal ileum, length 10 cm from ileocecal valve with upstream dilated fluid-filled loops of distal ileum. New additional short segments of thickened distal ileum (series 3 image 90 and 94) forexample 2 cm long segment of thickened distal ileum with severe luminal narrowing and upstream dilated small bowel loops between 3.5-4.1 cm. There is vasa recta engorgement around pelvic small bowel loops. Oral contrast migrated to distal ileum. Normal appendix. No pneumoperitoneum. Enteric tube tip within gastric body lumen. Collapsed stomach and proximal small bowel loops. Increased small pelvic and perihepatic ascites.    Lymph nodes: No lymphadenopathy.    Aorta/IVC: Normal caliber.    Abdominal wall: No hernia.    Bones/Soft tissues: Ankylosis sacroiliac joints.      IMPRESSION:    1.  Since Jojo 3, 2021, increased extent of multiple foci of discontinuous high-grade luminal narrowing in terminal and distal ileal segments with slightly increased dilated upstream fluid-filled small bowel loops suggesting obstruction/stricture, consider Crohn's inflammatory bowel disease, infectious enteritis not excluded. Advise gastroenterology consultation.  2.  Slightly increased small ascites, likely reactive.          Thank you for the opportunity to participate in the care of this patient.      LAURENCE HOYOS MD, ATTENDING RADIOLOGIST  This document has been electronically signed. Jun 7 2021  2:30PM (06-07-21 @ 12:43)

## 2021-06-08 NOTE — PROGRESS NOTE ADULT - ASSESSMENT
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 Presenting with abdominal pain and nausea. Last BF 2 days before admission ago, CT with evidence of SBO now having BF    NPO/IVF  NGT to LIWS  Valacyclovir oral   Pain/nausea control (avoid narcotics)  OOBA/SCD/IS/SQH  KEHINDE  AM labs  Small bowel series this AM

## 2021-06-08 NOTE — PROGRESS NOTE ADULT - SUBJECTIVE AND OBJECTIVE BOX
Patient is a 66y old  Male who presents with a chief complaint of Small bowel obstruction (06 Jun 2021 21:24)      INTERVAL HPI/OVERNIGHT EVENTS:  Patient was seen and examined at bedside. As per nurse and patient, no o/n events, patient resting comfortably. Endorses improvement in the abdominal pain. Endorses +ROBF however not with sig BM. Still with NGT output. Patient denies: fever, chills, dizziness, weakness, HA, Changes in vision, CP, palpitations, SOB, cough, N/V/D/C, dysuria.    PAST MEDICAL & SURGICAL HISTORY:  Human immunodeficiency virus (HIV) infection    HTN (hypertension)    DM (diabetes mellitus)    Vocal cord cancer    Chronic GERD    HLD (hyperlipidemia)    History of herpes zoster with AIDS    Heart attack    History of anal cancer    S/P radiotherapy    No significant past surgical history          T(C): 37.2 (06-08-21 @ 12:50), Max: 37.5 (06-08-21 @ 00:48)  HR: 80 (06-08-21 @ 12:50) (65 - 80)  BP: 150/84 (06-08-21 @ 12:50) (142/82 - 164/71)  RR: 17 (06-08-21 @ 12:50) (17 - 18)  SpO2: 97% (06-08-21 @ 12:50) (94% - 97%)  Wt(kg): --  I&O's Summary    07 Jun 2021 07:01  -  08 Jun 2021 07:00  --------------------------------------------------------  IN: 1280 mL / OUT: 2350 mL / NET: -1070 mL    08 Jun 2021 07:01  -  08 Jun 2021 14:21  --------------------------------------------------------  IN: 0 mL / OUT: 400 mL / NET: -400 mL        PHYSICAL EXAM:  GENERAL: NAD, laying comfortably in bed  HEAD:  Atraumatic, Normocephalic  EYES: EOMI, PERRLA, conjunctiva and sclera clear  ENMT: No tonsillar erythema, exudates, or enlargement; +NGT  NECK: Supple, No JVD  NERVOUS SYSTEM:  Alert & Oriented X3, no focal deficits   CHEST/LUNG: Clear to percussion bilaterally; No rales, rhonchi, wheezing, or rubs  HEART: Regular rate and rhythm; No murmurs, rubs, or gallops  ABDOMEN: Soft, +mild tenderness diffusely, +distention  EXTREMITIES:  2+ Peripheral Pulses, No clubbing, cyanosis, or edema  LYMPH: No lymphadenopathy noted  SKIN: No rashes or lesions      LABS:                        13.2   7.02  )-----------( 223      ( 08 Jun 2021 08:00 )             38.3     06-08    136  |  100  |  8   ----------------------------<  173<H>  3.6   |  23  |  0.89    Ca    8.8      08 Jun 2021 08:00  Phos  2.6     06-08  Mg     2.0     06-08      PT/INR - ( 08 Jun 2021 08:00 )   PT: 13.6 sec;   INR: 1.14          PTT - ( 08 Jun 2021 08:00 )  PTT:32.7 sec    CAPILLARY BLOOD GLUCOSE      POCT Blood Glucose.: 162 mg/dL (08 Jun 2021 14:11)  POCT Blood Glucose.: 174 mg/dL (08 Jun 2021 08:04)  POCT Blood Glucose.: 154 mg/dL (07 Jun 2021 21:43)  POCT Blood Glucose.: 127 mg/dL (07 Jun 2021 16:59)            MEDICATIONS  (STANDING):  benzocaine 15 mG/menthol 3.6 mG Lozenge 1 Lozenge Oral three times a day  dextrose 40% Gel 15 Gram(s) Oral once  dextrose 5% + sodium chloride 0.45%. 1000 milliLiter(s) (110 mL/Hr) IV Continuous <Continuous>  dextrose 5%. 1000 milliLiter(s) (50 mL/Hr) IV Continuous <Continuous>  dextrose 5%. 1000 milliLiter(s) (100 mL/Hr) IV Continuous <Continuous>  dextrose 50% Injectable 25 Gram(s) IV Push once  dextrose 50% Injectable 12.5 Gram(s) IV Push once  dextrose 50% Injectable 25 Gram(s) IV Push once  glucagon  Injectable 1 milliGRAM(s) IntraMuscular once  heparin   Injectable 5000 Unit(s) SubCutaneous every 8 hours  insulin lispro (ADMELOG) corrective regimen sliding scale   SubCutaneous Before meals and at bedtime  levothyroxine Injectable 40 MICROGram(s) IV Push <User Schedule>  metoprolol tartrate Injectable 5 milliGRAM(s) IV Push every 6 hours  pantoprazole  Injectable 40 milliGRAM(s) IV Push every 12 hours  valACYclovir 500 milliGRAM(s) Oral daily    MEDICATIONS  (PRN):  ondansetron Injectable 4 milliGRAM(s) IV Push once PRN Nausea and/or Vomiting      RADIOLOGY & ADDITIONAL TESTS:    Imaging Personally Reviewed:  [ ] YES  [ ] NO    Consultant(s) Notes Reviewed:  [ ] YES  [ ] NO    Care Discussed with Consultants/Other Providers [ ] YES  [ ] NO

## 2021-06-08 NOTE — PROGRESS NOTE ADULT - ASSESSMENT
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     #SBO: plan per primary team, CW NGT, CT still with evidence of SBO now having BF, for small bowel series today  #Pre-op: Pre-op medical evaluation: can perform >10 METS, EKG reviewed - q waves indicative of prior MI, CAD is stable, RCRI class II risk (class III risk if high risk surgery), patient is optimized, can proceed to surgery if needed, c/w metoprolol, hold ACEi day of surgery if going  #HIV: c/w ART, HIV team consulted, appreciate recs  #CAD: c/w metoprolol, c/w statin and home ASA once diet is advanced  #Hypothyroidism: c/w synthroid  #DM: A1C 7.3, was on Metformin at home but discontinued 2/2 GI side effects, c/w SSI for now  #Hypertriglyceridemia: c/w Vascepa  #HTN: c/w metoprolol, hold ACEi day of surgery if going, likely component of pain,  #Anal cancer  #Vocal cord cancer  # Sleep disturbance --- reports difficulty falling asleep at night - recommend trial of melatonin   #DVT ppx: HSQ

## 2021-06-09 LAB
ANION GAP SERPL CALC-SCNC: 12 MMOL/L — SIGNIFICANT CHANGE UP (ref 5–17)
BUN SERPL-MCNC: 7 MG/DL — SIGNIFICANT CHANGE UP (ref 7–23)
CALCIUM SERPL-MCNC: 8.8 MG/DL — SIGNIFICANT CHANGE UP (ref 8.4–10.5)
CHLORIDE SERPL-SCNC: 101 MMOL/L — SIGNIFICANT CHANGE UP (ref 96–108)
CO2 SERPL-SCNC: 24 MMOL/L — SIGNIFICANT CHANGE UP (ref 22–31)
CREAT SERPL-MCNC: 0.92 MG/DL — SIGNIFICANT CHANGE UP (ref 0.5–1.3)
GLUCOSE BLDC GLUCOMTR-MCNC: 139 MG/DL — HIGH (ref 70–99)
GLUCOSE BLDC GLUCOMTR-MCNC: 148 MG/DL — HIGH (ref 70–99)
GLUCOSE BLDC GLUCOMTR-MCNC: 148 MG/DL — HIGH (ref 70–99)
GLUCOSE BLDC GLUCOMTR-MCNC: 194 MG/DL — HIGH (ref 70–99)
GLUCOSE BLDC GLUCOMTR-MCNC: 213 MG/DL — HIGH (ref 70–99)
GLUCOSE SERPL-MCNC: 204 MG/DL — HIGH (ref 70–99)
HCT VFR BLD CALC: 39.6 % — SIGNIFICANT CHANGE UP (ref 39–50)
HGB BLD-MCNC: 13.6 G/DL — SIGNIFICANT CHANGE UP (ref 13–17)
MAGNESIUM SERPL-MCNC: 1.8 MG/DL — SIGNIFICANT CHANGE UP (ref 1.6–2.6)
MCHC RBC-ENTMCNC: 31.6 PG — SIGNIFICANT CHANGE UP (ref 27–34)
MCHC RBC-ENTMCNC: 34.3 GM/DL — SIGNIFICANT CHANGE UP (ref 32–36)
MCV RBC AUTO: 92.1 FL — SIGNIFICANT CHANGE UP (ref 80–100)
NRBC # BLD: 0 /100 WBCS — SIGNIFICANT CHANGE UP (ref 0–0)
PHOSPHATE SERPL-MCNC: 2.3 MG/DL — LOW (ref 2.5–4.5)
PLATELET # BLD AUTO: 244 K/UL — SIGNIFICANT CHANGE UP (ref 150–400)
POTASSIUM SERPL-MCNC: 3.8 MMOL/L — SIGNIFICANT CHANGE UP (ref 3.5–5.3)
POTASSIUM SERPL-SCNC: 3.8 MMOL/L — SIGNIFICANT CHANGE UP (ref 3.5–5.3)
RBC # BLD: 4.3 M/UL — SIGNIFICANT CHANGE UP (ref 4.2–5.8)
RBC # FLD: 12.8 % — SIGNIFICANT CHANGE UP (ref 10.3–14.5)
SODIUM SERPL-SCNC: 137 MMOL/L — SIGNIFICANT CHANGE UP (ref 135–145)
WBC # BLD: 6.63 K/UL — SIGNIFICANT CHANGE UP (ref 3.8–10.5)
WBC # FLD AUTO: 6.63 K/UL — SIGNIFICANT CHANGE UP (ref 3.8–10.5)

## 2021-06-09 PROCEDURE — 99232 SBSQ HOSP IP/OBS MODERATE 35: CPT | Mod: GC

## 2021-06-09 RX ORDER — MAGNESIUM SULFATE 500 MG/ML
1 VIAL (ML) INJECTION ONCE
Refills: 0 | Status: COMPLETED | OUTPATIENT
Start: 2021-06-09 | End: 2021-06-09

## 2021-06-09 RX ORDER — SODIUM CHLORIDE 9 MG/ML
1000 INJECTION, SOLUTION INTRAVENOUS
Refills: 0 | Status: DISCONTINUED | OUTPATIENT
Start: 2021-06-09 | End: 2021-06-10

## 2021-06-09 RX ORDER — POTASSIUM PHOSPHATE, MONOBASIC POTASSIUM PHOSPHATE, DIBASIC 236; 224 MG/ML; MG/ML
15 INJECTION, SOLUTION INTRAVENOUS ONCE
Refills: 0 | Status: COMPLETED | OUTPATIENT
Start: 2021-06-09 | End: 2021-06-09

## 2021-06-09 RX ADMIN — HEPARIN SODIUM 5000 UNIT(S): 5000 INJECTION INTRAVENOUS; SUBCUTANEOUS at 22:36

## 2021-06-09 RX ADMIN — HEPARIN SODIUM 5000 UNIT(S): 5000 INJECTION INTRAVENOUS; SUBCUTANEOUS at 14:22

## 2021-06-09 RX ADMIN — ATORVASTATIN CALCIUM 80 MILLIGRAM(S): 80 TABLET, FILM COATED ORAL at 22:35

## 2021-06-09 RX ADMIN — Medication 5 MILLIGRAM(S): at 22:36

## 2021-06-09 RX ADMIN — Medication 2.5 MILLIGRAM(S): at 05:30

## 2021-06-09 RX ADMIN — Medication 50 MILLIGRAM(S): at 05:30

## 2021-06-09 RX ADMIN — EMTRICITABINE AND TENOFOVIR DISOPROXIL FUMARATE 1 TABLET(S): 200; 300 TABLET, FILM COATED ORAL at 14:22

## 2021-06-09 RX ADMIN — Medication 2: at 09:16

## 2021-06-09 RX ADMIN — POTASSIUM PHOSPHATE, MONOBASIC POTASSIUM PHOSPHATE, DIBASIC 62.5 MILLIMOLE(S): 236; 224 INJECTION, SOLUTION INTRAVENOUS at 22:35

## 2021-06-09 RX ADMIN — VALACYCLOVIR 500 MILLIGRAM(S): 500 TABLET, FILM COATED ORAL at 14:22

## 2021-06-09 RX ADMIN — Medication 100 GRAM(S): at 08:19

## 2021-06-09 RX ADMIN — HEPARIN SODIUM 5000 UNIT(S): 5000 INJECTION INTRAVENOUS; SUBCUTANEOUS at 05:31

## 2021-06-09 RX ADMIN — PANTOPRAZOLE SODIUM 40 MILLIGRAM(S): 20 TABLET, DELAYED RELEASE ORAL at 07:54

## 2021-06-09 RX ADMIN — Medication 50 MICROGRAM(S): at 05:31

## 2021-06-09 NOTE — PROGRESS NOTE ADULT - SUBJECTIVE AND OBJECTIVE BOX
Patient is a 66y old  Male who presents with a chief complaint of Small bowel obstruction (09 Jun 2021 13:21)      INTERVAL HPI/OVERNIGHT EVENTS:  Patient was seen and examined at bedside. As per nurse and patient, no o/n events, patient resting comfortably. No complaints at this time. Tolerating liquid diet. Patient denies: fever, chills, dizziness, weakness, HA, Changes in vision, CP, palpitations, SOB, cough, N/V/D/C, dysuria, changes in bowel movements, LE edema.      PAST MEDICAL & SURGICAL HISTORY:  Human immunodeficiency virus (HIV) infection    HTN (hypertension)    DM (diabetes mellitus)    Vocal cord cancer    Chronic GERD    HLD (hyperlipidemia)    History of herpes zoster with AIDS    Heart attack    History of anal cancer    S/P radiotherapy    No significant past surgical history      T(C): 37.4 (06-09-21 @ 12:40), Max: 37.5 (06-08-21 @ 16:22)  HR: 61 (06-09-21 @ 12:40) (61 - 73)  BP: 104/61 (06-09-21 @ 12:40) (104/61 - 154/62)  RR: 18 (06-09-21 @ 12:40) (16 - 18)  SpO2: 95% (06-09-21 @ 12:40) (94% - 97%)  Wt(kg): --  I&O's Summary    08 Jun 2021 07:01  -  09 Jun 2021 07:00  --------------------------------------------------------  IN: 2670 mL / OUT: 1150 mL / NET: 1520 mL    09 Jun 2021 07:01  -  09 Jun 2021 14:03  --------------------------------------------------------  IN: 200 mL / OUT: 0 mL / NET: 200 mL        PHYSICAL EXAM:  GENERAL: NAD, laying comfortably in bed  HEAD:  Atraumatic, Normocephalic  EYES: EOMI, PERRLA, conjunctiva and sclera clear  ENMT: No tonsillar erythema, exudates, or enlargement  NECK: Supple, No JVD  NERVOUS SYSTEM:  Alert & Oriented X3, no focal deficits   CHEST/LUNG: Clear to percussion bilaterally; No rales, rhonchi, wheezing, or rubs  HEART: Regular rate and rhythm; No murmurs, rubs, or gallops  ABDOMEN: Soft, +minimal tenderness diffusely, +distention  EXTREMITIES:  2+ Peripheral Pulses, No clubbing, cyanosis, or edema  LYMPH: No lymphadenopathy noted  SKIN: No rashes or lesions      LABS:                        13.6   6.63  )-----------( 244      ( 09 Jun 2021 06:30 )             39.6     06-09    137  |  101  |  7   ----------------------------<  204<H>  3.8   |  24  |  0.92    Ca    8.8      09 Jun 2021 06:30  Phos  2.3     06-09  Mg     1.8     06-09      PT/INR - ( 08 Jun 2021 08:00 )   PT: 13.6 sec;   INR: 1.14          PTT - ( 08 Jun 2021 08:00 )  PTT:32.7 sec    CAPILLARY BLOOD GLUCOSE      POCT Blood Glucose.: 139 mg/dL (09 Jun 2021 12:28)  POCT Blood Glucose.: 213 mg/dL (09 Jun 2021 09:13)  POCT Blood Glucose.: 194 mg/dL (09 Jun 2021 07:58)  POCT Blood Glucose.: 160 mg/dL (08 Jun 2021 21:37)  POCT Blood Glucose.: 163 mg/dL (08 Jun 2021 18:11)  POCT Blood Glucose.: 162 mg/dL (08 Jun 2021 14:11)            MEDICATIONS  (STANDING):  atorvastatin 80 milliGRAM(s) Oral at bedtime  benzocaine 15 mG/menthol 3.6 mG Lozenge 1 Lozenge Oral three times a day  dextrose 40% Gel 15 Gram(s) Oral once  dextrose 5% + sodium chloride 0.45%. 1000 milliLiter(s) (50 mL/Hr) IV Continuous <Continuous>  dextrose 5%. 1000 milliLiter(s) (50 mL/Hr) IV Continuous <Continuous>  dextrose 5%. 1000 milliLiter(s) (100 mL/Hr) IV Continuous <Continuous>  dextrose 50% Injectable 25 Gram(s) IV Push once  dextrose 50% Injectable 12.5 Gram(s) IV Push once  dextrose 50% Injectable 25 Gram(s) IV Push once  emtricitabine 200 mG/tenofovir 300 mG (TRUVADA) 1 Tablet(s) Oral daily  enalapril 2.5 milliGRAM(s) Oral daily  glucagon  Injectable 1 milliGRAM(s) IntraMuscular once  heparin   Injectable 5000 Unit(s) SubCutaneous every 8 hours  insulin lispro (ADMELOG) corrective regimen sliding scale   SubCutaneous Before meals and at bedtime  levothyroxine 50 MICROGram(s) Oral daily  metoprolol tartrate 50 milliGRAM(s) Oral daily  pantoprazole    Tablet 40 milliGRAM(s) Oral before breakfast  potassium phosphate IVPB 15 milliMole(s) IV Intermittent once  valACYclovir 500 milliGRAM(s) Oral daily    MEDICATIONS  (PRN):  acetaminophen   Tablet .. 1000 milliGRAM(s) Oral every 6 hours PRN Temp greater or equal to 38C (100.4F), Moderate Pain (4 - 6)  melatonin 5 milliGRAM(s) Oral at bedtime PRN Insomnia  ondansetron Injectable 4 milliGRAM(s) IV Push once PRN Nausea and/or Vomiting      RADIOLOGY & ADDITIONAL TESTS:    Imaging Personally Reviewed:  [ ] YES  [ ] NO    Consultant(s) Notes Reviewed:  [ ] YES  [ ] NO    Care Discussed with Consultants/Other Providers [ ] YES  [ ] NO

## 2021-06-09 NOTE — DIETITIAN INITIAL EVALUATION ADULT. - ORAL INTAKE PTA/DIET HISTORY
patient reported he eats "everything""alot of pasta"Does not use salt.No specific restrictions reported.Denied any food allergies.Claims he has had loose stools for years.Patient reported his VL has been undetectable.

## 2021-06-09 NOTE — DIETITIAN INITIAL EVALUATION ADULT. - OTHER INFO
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 Presenting with abdominal pain and nausea. Last BF 2 days before admission ago, CT with evidence of SBO now having bowel movements .No N/V/ thrush with only slight pain reported. Skin intact. Ongoing complaints of loose stools .Patient reported UBW is same at 150lbs,100% of UBW,97% of IBW. Eating about 75% of meals now. 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 Good comprehension.

## 2021-06-09 NOTE — PROGRESS NOTE ADULT - SUBJECTIVE AND OBJECTIVE BOX
SUBJECTIVE: Multiple watery BMs, kym clears, no N/V. Patient seen and examined bedside by chief resident.    emtricitabine 200 mG/tenofovir 300 mG (TRUVADA) 1 Tablet(s) Oral daily  enalapril 2.5 milliGRAM(s) Oral daily  heparin   Injectable 5000 Unit(s) SubCutaneous every 8 hours  metoprolol tartrate 50 milliGRAM(s) Oral daily  valACYclovir 500 milliGRAM(s) Oral daily    MEDICATIONS  (PRN):  acetaminophen   Tablet .. 1000 milliGRAM(s) Oral every 6 hours PRN Temp greater or equal to 38C (100.4F), Moderate Pain (4 - 6)  melatonin 5 milliGRAM(s) Oral at bedtime PRN Insomnia  ondansetron Injectable 4 milliGRAM(s) IV Push once PRN Nausea and/or Vomiting      I&O's Detail    08 Jun 2021 07:01  -  09 Jun 2021 07:00  --------------------------------------------------------  IN:    dextrose 5% + sodium chloride 0.45%: 2310 mL    Oral Fluid: 360 mL  Total IN: 2670 mL    OUT:    Nasogastric/Oral tube (mL): 50 mL    Voided (mL): 1100 mL  Total OUT: 1150 mL    Total NET: 1520 mL          Vital Signs Last 24 Hrs  T(C): 36.6 (09 Jun 2021 05:27), Max: 37.5 (08 Jun 2021 16:22)  T(F): 97.8 (09 Jun 2021 05:27), Max: 99.5 (08 Jun 2021 16:22)  HR: 69 (09 Jun 2021 05:27) (67 - 80)  BP: 132/70 (09 Jun 2021 05:27) (123/68 - 154/62)  BP(mean): --  RR: 18 (09 Jun 2021 05:27) (17 - 18)  SpO2: 96% (09 Jun 2021 05:27) (95% - 97%)    General: NAD, resting comfortably in bed  C/V: NSR  Pulm: Nonlabored breathing, no respiratory distress  Abd: softly distended, nontender  Extrem: SCDs in place    LABS:                        13.6   6.63  )-----------( 244      ( 09 Jun 2021 06:30 )             39.6     06-09    137  |  101  |  7   ----------------------------<  204<H>  3.8   |  24  |  0.92    Ca    8.8      09 Jun 2021 06:30  Phos  2.3     06-09  Mg     1.8     06-09      PT/INR - ( 08 Jun 2021 08:00 )   PT: 13.6 sec;   INR: 1.14          PTT - ( 08 Jun 2021 08:00 )  PTT:32.7 sec      RADIOLOGY & ADDITIONAL STUDIES:  CT Abdomen and Pelvis w/ Oral Cont and w/ IV Cont:   EXAM:  CT ABDOMEN AND PELVIS OC IC                          PROCEDURE DATE:  06/07/2021          INTERPRETATION:  CT of the ABDOMEN and PELVIS with intravenous contrast dated 6/7/2021 12:43 PM    INDICATION: Small bowel obstruction. Nausea. Remote history of anal cancer post chemoradiotherapy 1998      TECHNIQUE: CT of the abdomen and pelvis with intravenous and oral contrast. Axial, sagittal, and coronal images were obtained and reviewed.    COMPARISON: CT abdomen pelvis June 4, 2021.    FINDINGS:    Lower chest: Clear lung bases.    Liver: Smooth in contour. No focal lesion. Patent portal and hepatic veins..    Biliary system: Gallbladder is normal in size. No calcified gallstones. No biliary ductal dilatation.    Pancreas: Unremarkable.    Spleen: Unremarkable.    Adrenal glands: Unremarkable.    Kidneys: Symmetric parenchymal enhancement. No renal mass. No hydronephrosis. No renal or ureteral stone.  Urinary Bladder: Unremarkable.    Reproductive organs: Unremarkable.    Bowel/Peritoneum: Thickened hyperenhancing collapsed loop of terminal/distal ileum, length 10 cm from ileocecal valve with upstream dilated fluid-filled loops of distal ileum. New additional short segments of thickened distal ileum (series 3 image 90 and 94) forexample 2 cm long segment of thickened distal ileum with severe luminal narrowing and upstream dilated small bowel loops between 3.5-4.1 cm. There is vasa recta engorgement around pelvic small bowel loops. Oral contrast migrated to distal ileum. Normal appendix. No pneumoperitoneum. Enteric tube tip within gastric body lumen. Collapsed stomach and proximal small bowel loops. Increased small pelvic and perihepatic ascites.    Lymph nodes: No lymphadenopathy.    Aorta/IVC: Normal caliber.    Abdominal wall: No hernia.    Bones/Soft tissues: Ankylosis sacroiliac joints.      IMPRESSION:    1.  Since Jojo 3, 2021, increased extent of multiple foci of discontinuous high-grade luminal narrowing in terminal and distal ileal segments with slightly increased dilated upstream fluid-filled small bowel loops suggesting obstruction/stricture, consider Crohn's inflammatory bowel disease, infectious enteritis not excluded. Advise gastroenterology consultation.  2.  Slightly increased small ascites, likely reactive.          Thank you for the opportunity to participate in the care of this patient.      LAURENCE HOYOS MD, ATTENDING RADIOLOGIST  This document has been electronically signed. Jun 7 2021  2:30PM (06-07-21 @ 12:43)

## 2021-06-09 NOTE — PROGRESS NOTE ADULT - ASSESSMENT
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     #SBO: plan per primary team,CT still with evidence of SBO now having BF, ADAT  #HIV: c/w ART, HIV team consulted, appreciate recs  #CAD: c/w metoprolol, c/w statin and home ASA   #Hypothyroidism: c/w synthroid  #DM: A1C 7.3, was on Metformin at home but discontinued 2/2 GI side effects, c/w SSI for now  #Hypertriglyceridemia: c/w Vascepa  #HTN: c/w metoprolol, ACEi  #Anal cancer  #Vocal cord cancer  # Sleep disturbance --- reports difficulty falling asleep at night - recommend trial of melatonin   #DVT ppx: HSQ   66 year old male with HIV on ART, CAD w/ MI s/p PCI x3 in 2000, HTN, NIDDM, GERD, HLD, hypothyroidism, herpes, anal cancer in 1998 (s/p chemo-radiation), 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     #SBO: plan per primary team,CT still with evidence of SBO now having BF, ADAT  #hyperglycemia: likely iatrogenic from D5gtt, stop dextrose in fluids now that diet is advaned  #HIV: c/w ART  #CAD: c/w metoprolol, c/w statin and home ASA   #Hypothyroidism: c/w synthroid  #DM: A1C 7.3, was on Metformin at home but discontinued 2/2 GI side effects, c/w SSI for now  #Hypertriglyceridemia: c/w Vascepa  #HTN: c/w metoprolol, ACEi  #Anal cancer  #Vocal cord cancer  # Sleep disturbance --- reports difficulty falling asleep at night - recommend trial of melatonin   #DVT ppx: HSQ

## 2021-06-09 NOTE — PROGRESS NOTE ADULT - ASSESSMENT
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 Presenting with abdominal pain and nausea. Last BF 2 days before admission ago, CT with evidence of SBO now having bowel movements    CLD/IVF  Valacyclovir oral   Pain/nausea control (avoid narcotics)  OOBA/SCD/IS/SQH  AM labs

## 2021-06-10 ENCOUNTER — TRANSCRIPTION ENCOUNTER (OUTPATIENT)
Age: 66
End: 2021-06-10

## 2021-06-10 VITALS
DIASTOLIC BLOOD PRESSURE: 69 MMHG | OXYGEN SATURATION: 97 % | TEMPERATURE: 99 F | HEART RATE: 59 BPM | SYSTOLIC BLOOD PRESSURE: 116 MMHG | RESPIRATION RATE: 18 BRPM

## 2021-06-10 LAB
ANION GAP SERPL CALC-SCNC: 11 MMOL/L — SIGNIFICANT CHANGE UP (ref 5–17)
BUN SERPL-MCNC: 8 MG/DL — SIGNIFICANT CHANGE UP (ref 7–23)
CALCIUM SERPL-MCNC: 8.7 MG/DL — SIGNIFICANT CHANGE UP (ref 8.4–10.5)
CHLORIDE SERPL-SCNC: 103 MMOL/L — SIGNIFICANT CHANGE UP (ref 96–108)
CO2 SERPL-SCNC: 22 MMOL/L — SIGNIFICANT CHANGE UP (ref 22–31)
CREAT SERPL-MCNC: 1 MG/DL — SIGNIFICANT CHANGE UP (ref 0.5–1.3)
CULTURE RESULTS: SIGNIFICANT CHANGE UP
CULTURE RESULTS: SIGNIFICANT CHANGE UP
GLUCOSE BLDC GLUCOMTR-MCNC: 138 MG/DL — HIGH (ref 70–99)
GLUCOSE BLDC GLUCOMTR-MCNC: 148 MG/DL — HIGH (ref 70–99)
GLUCOSE BLDC GLUCOMTR-MCNC: 148 MG/DL — HIGH (ref 70–99)
GLUCOSE SERPL-MCNC: 154 MG/DL — HIGH (ref 70–99)
HCT VFR BLD CALC: 37.8 % — LOW (ref 39–50)
HGB BLD-MCNC: 12.9 G/DL — LOW (ref 13–17)
MAGNESIUM SERPL-MCNC: 2 MG/DL — SIGNIFICANT CHANGE UP (ref 1.6–2.6)
MCHC RBC-ENTMCNC: 31.4 PG — SIGNIFICANT CHANGE UP (ref 27–34)
MCHC RBC-ENTMCNC: 34.1 GM/DL — SIGNIFICANT CHANGE UP (ref 32–36)
MCV RBC AUTO: 92 FL — SIGNIFICANT CHANGE UP (ref 80–100)
NRBC # BLD: 0 /100 WBCS — SIGNIFICANT CHANGE UP (ref 0–0)
PHOSPHATE SERPL-MCNC: 3.8 MG/DL — SIGNIFICANT CHANGE UP (ref 2.5–4.5)
PLATELET # BLD AUTO: 263 K/UL — SIGNIFICANT CHANGE UP (ref 150–400)
POTASSIUM SERPL-MCNC: 3.7 MMOL/L — SIGNIFICANT CHANGE UP (ref 3.5–5.3)
POTASSIUM SERPL-SCNC: 3.7 MMOL/L — SIGNIFICANT CHANGE UP (ref 3.5–5.3)
RBC # BLD: 4.11 M/UL — LOW (ref 4.2–5.8)
RBC # FLD: 12.9 % — SIGNIFICANT CHANGE UP (ref 10.3–14.5)
SODIUM SERPL-SCNC: 136 MMOL/L — SIGNIFICANT CHANGE UP (ref 135–145)
SPECIMEN SOURCE: SIGNIFICANT CHANGE UP
SPECIMEN SOURCE: SIGNIFICANT CHANGE UP
WBC # BLD: 6.29 K/UL — SIGNIFICANT CHANGE UP (ref 3.8–10.5)
WBC # FLD AUTO: 6.29 K/UL — SIGNIFICANT CHANGE UP (ref 3.8–10.5)

## 2021-06-10 PROCEDURE — 82962 GLUCOSE BLOOD TEST: CPT

## 2021-06-10 PROCEDURE — 84484 ASSAY OF TROPONIN QUANT: CPT

## 2021-06-10 PROCEDURE — 71045 X-RAY EXAM CHEST 1 VIEW: CPT

## 2021-06-10 PROCEDURE — 86900 BLOOD TYPING SEROLOGIC ABO: CPT

## 2021-06-10 PROCEDURE — 86360 T CELL ABSOLUTE COUNT/RATIO: CPT

## 2021-06-10 PROCEDURE — 85730 THROMBOPLASTIN TIME PARTIAL: CPT

## 2021-06-10 PROCEDURE — 85025 COMPLETE CBC W/AUTO DIFF WBC: CPT

## 2021-06-10 PROCEDURE — 87040 BLOOD CULTURE FOR BACTERIA: CPT

## 2021-06-10 PROCEDURE — 87536 HIV-1 QUANT&REVRSE TRNSCRPJ: CPT

## 2021-06-10 PROCEDURE — 74250 X-RAY XM SM INT 1CNTRST STD: CPT

## 2021-06-10 PROCEDURE — 83880 ASSAY OF NATRIURETIC PEPTIDE: CPT

## 2021-06-10 PROCEDURE — 80048 BASIC METABOLIC PNL TOTAL CA: CPT

## 2021-06-10 PROCEDURE — 83690 ASSAY OF LIPASE: CPT

## 2021-06-10 PROCEDURE — 87635 SARS-COV-2 COVID-19 AMP PRB: CPT

## 2021-06-10 PROCEDURE — 86769 SARS-COV-2 COVID-19 ANTIBODY: CPT

## 2021-06-10 PROCEDURE — 84100 ASSAY OF PHOSPHORUS: CPT

## 2021-06-10 PROCEDURE — U0003: CPT

## 2021-06-10 PROCEDURE — 83735 ASSAY OF MAGNESIUM: CPT

## 2021-06-10 PROCEDURE — 86803 HEPATITIS C AB TEST: CPT

## 2021-06-10 PROCEDURE — 99285 EMERGENCY DEPT VISIT HI MDM: CPT | Mod: 25

## 2021-06-10 PROCEDURE — 83036 HEMOGLOBIN GLYCOSYLATED A1C: CPT

## 2021-06-10 PROCEDURE — 99232 SBSQ HOSP IP/OBS MODERATE 35: CPT | Mod: GC

## 2021-06-10 PROCEDURE — 80307 DRUG TEST PRSMV CHEM ANLYZR: CPT

## 2021-06-10 PROCEDURE — 85027 COMPLETE CBC AUTOMATED: CPT

## 2021-06-10 PROCEDURE — 86359 T CELLS TOTAL COUNT: CPT

## 2021-06-10 PROCEDURE — 86850 RBC ANTIBODY SCREEN: CPT

## 2021-06-10 PROCEDURE — 93005 ELECTROCARDIOGRAM TRACING: CPT

## 2021-06-10 PROCEDURE — 83605 ASSAY OF LACTIC ACID: CPT

## 2021-06-10 PROCEDURE — 86901 BLOOD TYPING SEROLOGIC RH(D): CPT

## 2021-06-10 PROCEDURE — 36415 COLL VENOUS BLD VENIPUNCTURE: CPT

## 2021-06-10 PROCEDURE — 74018 RADEX ABDOMEN 1 VIEW: CPT

## 2021-06-10 PROCEDURE — 80053 COMPREHEN METABOLIC PANEL: CPT

## 2021-06-10 PROCEDURE — 81001 URINALYSIS AUTO W/SCOPE: CPT

## 2021-06-10 PROCEDURE — U0005: CPT

## 2021-06-10 PROCEDURE — 74177 CT ABD & PELVIS W/CONTRAST: CPT

## 2021-06-10 PROCEDURE — 85610 PROTHROMBIN TIME: CPT

## 2021-06-10 RX ORDER — POTASSIUM CHLORIDE 20 MEQ
10 PACKET (EA) ORAL
Refills: 0 | Status: COMPLETED | OUTPATIENT
Start: 2021-06-10 | End: 2021-06-10

## 2021-06-10 RX ADMIN — HEPARIN SODIUM 5000 UNIT(S): 5000 INJECTION INTRAVENOUS; SUBCUTANEOUS at 13:12

## 2021-06-10 RX ADMIN — Medication 50 MILLIGRAM(S): at 05:45

## 2021-06-10 RX ADMIN — PANTOPRAZOLE SODIUM 40 MILLIGRAM(S): 20 TABLET, DELAYED RELEASE ORAL at 07:00

## 2021-06-10 RX ADMIN — Medication 50 MICROGRAM(S): at 05:44

## 2021-06-10 RX ADMIN — HEPARIN SODIUM 5000 UNIT(S): 5000 INJECTION INTRAVENOUS; SUBCUTANEOUS at 05:46

## 2021-06-10 RX ADMIN — Medication 100 MILLIEQUIVALENT(S): at 11:01

## 2021-06-10 RX ADMIN — Medication 100 MILLIEQUIVALENT(S): at 17:03

## 2021-06-10 RX ADMIN — Medication 2.5 MILLIGRAM(S): at 05:44

## 2021-06-10 NOTE — PROGRESS NOTE ADULT - SUBJECTIVE AND OBJECTIVE BOX
24 hr events:  ON: BF+, OOBA,+, No vomit or nausea. grumbling sensation to abd, will bring home meds.   6/9: Adv to FLD, +BM, little flatus, still mildly distended. Tiffanie CLD, -n/-v    SUBJECTIVE:  Pt seen and examined by chief resident. Pt is doing well, resting comfortably on bed. Full liquid diet tolerated. Ambulating out of bed. Had 3-5 liquid BMs overnight, passing minimal gas.  No nausea or vomiting. Feeling distended    Vital Signs Last 24 Hrs  T(C): 36.7 (10 Stef 2021 08:24), Max: 37.4 (09 Jun 2021 12:40)  T(F): 98 (10 Stef 2021 08:24), Max: 99.4 (09 Jun 2021 12:40)  HR: 53 (10 Stef 2021 08:24) (53 - 72)  BP: 114/68 (10 Stef 2021 08:24) (104/61 - 135/74)  BP(mean): --  RR: 16 (10 Stef 2021 08:24) (16 - 18)  SpO2: 97% (10 Stef 2021 08:24) (94% - 100%)    Physical Exam:  General: NAD  Pulmonary: Nonlabored breathing, no respiratory distress  Abdominal: soft, mild distention, nontender  Extremities: WWP, SCDs in place    I&O's Summary    09 Jun 2021 07:01  -  10 Stef 2021 07:00  --------------------------------------------------------  IN: 1650 mL / OUT: 250 mL / NET: 1400 mL    10 Stef 2021 07:01  -  10 Stef 2021 12:31  --------------------------------------------------------  IN: 630 mL / OUT: 200 mL / NET: 430 mL        LABS:                        12.9   6.29  )-----------( 263      ( 10 Stef 2021 08:16 )             37.8     06-10    136  |  103  |  8   ----------------------------<  154<H>  3.7   |  22  |  1.00    Ca    8.7      10 Stef 2021 08:16  Phos  3.8     06-10  Mg     2.0     06-10          CAPILLARY BLOOD GLUCOSE  POCT Blood Glucose.: 138 mg/dL (10 Stef 2021 12:21)  POCT Blood Glucose.: 148 mg/dL (10 Stef 2021 07:56)  POCT Blood Glucose.: 148 mg/dL (10 Stef 2021 06:45)  POCT Blood Glucose.: 148 mg/dL (09 Jun 2021 21:53)  POCT Blood Glucose.: 148 mg/dL (09 Jun 2021 17:38)

## 2021-06-10 NOTE — PROGRESS NOTE ADULT - NSICDXPILOT_GEN_ALL_CORE
[place]

## 2021-06-10 NOTE — PROGRESS NOTE ADULT - ASSESSMENT
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     #SBO: plan per primary team,CT still with evidence of SBO now having BF, ADAT  #hyperglycemia: resolved with stopping D5gtt  #HIV: c/w ART, will need to bring home med  #CAD: c/w metoprolol, c/w statin and home ASA   #Hypothyroidism: c/w synthroid  #DM: A1C 7.3, was on Metformin at home but discontinued 2/2 GI side effects, c/w SSI for now  #Hypertriglyceridemia: c/w Vascepa  #HTN: c/w metoprolol, ACEi  #Anal cancer  #Vocal cord cancer  # Sleep disturbance --- reports difficulty falling asleep at night - recommend trial of melatonin   #DVT ppx: HSQ

## 2021-06-10 NOTE — DISCHARGE NOTE NURSING/CASE MANAGEMENT/SOCIAL WORK - PATIENT PORTAL LINK FT
You can access the FollowMyHealth Patient Portal offered by [facility] by registering at the following website: [email or web address]. By joining [facility]’s FollowMyHealth portal, you will also be able to view your health information using other applications (apps) compatible with our system.

## 2021-06-10 NOTE — DISCHARGE NOTE PROVIDER - NSDCCPCAREPLAN_GEN_ALL_CORE_FT
PRINCIPAL DISCHARGE DIAGNOSIS  Diagnosis: Intestinal obstruction, unspecified cause, unspecified whether partial or complete  Assessment and Plan of Treatment:       
20

## 2021-06-10 NOTE — DISCHARGE NOTE PROVIDER - NSCORESITESY/N_GEN_A_CORE_RD
Hematology-Oncology Progress Note      Enzo Marroquin  1952  [id number]  Jagruti Louis MD  Hematology/Oncology  Phone [phone] No

## 2021-06-10 NOTE — PROGRESS NOTE ADULT - SUBJECTIVE AND OBJECTIVE BOX
Patient is a 66y old  Male who presents with a chief complaint of Small bowel obstruction (09 Jun 2021 13:21)      INTERVAL HPI/OVERNIGHT EVENTS:  Patient was seen and examined at bedside. As per nurse and patient, no o/n events, patient resting comfortably. Endorses ROBF. Has several liquid bowel movements, still endorses feeling bloated. Patient denies: fever, chills, dizziness, weakness, HA, Changes in vision, CP, palpitations, SOB, cough, N/V/D/C, dysuria, changes in bowel movements, LE edema.      PAST MEDICAL & SURGICAL HISTORY:  Human immunodeficiency virus (HIV) infection    HTN (hypertension)    DM (diabetes mellitus)    Vocal cord cancer    Chronic GERD    HLD (hyperlipidemia)    History of herpes zoster with AIDS    Heart attack    History of anal cancer    S/P radiotherapy    No significant past surgical history      T(C): 37.1 (06-10-21 @ 13:58), Max: 37.1 (06-10-21 @ 13:58)  HR: 59 (06-10-21 @ 13:58) (53 - 72)  BP: 116/69 (06-10-21 @ 13:58) (114/68 - 135/74)  RR: 18 (06-10-21 @ 13:58) (16 - 18)  SpO2: 97% (06-10-21 @ 13:58) (94% - 100%)  Wt(kg): --  I&O's Summary    09 Jun 2021 07:01  -  10 Stef 2021 07:00  --------------------------------------------------------  IN: 1650 mL / OUT: 250 mL / NET: 1400 mL    10 Stef 2021 07:01  -  10 Stef 2021 14:02  --------------------------------------------------------  IN: 930 mL / OUT: 500 mL / NET: 430 mL        PHYSICAL EXAM:  GENERAL: NAD, laying comfortably in bed  HEAD:  Atraumatic, Normocephalic  EYES: EOMI, PERRLA, conjunctiva and sclera clear  ENMT: No tonsillar erythema, exudates, or enlargement; MMM  NECK: Supple, No JVD  NERVOUS SYSTEM:  Alert & Oriented X3, no focal deficits   CHEST/LUNG: Clear to percussion bilaterally; No rales, rhonchi, wheezing, or rubs  HEART: Regular rate and rhythm; No murmurs, rubs, or gallops  ABDOMEN: Soft, Nontender, +mild distention; Bowel sounds present  EXTREMITIES:  2+ Peripheral Pulses, No clubbing, cyanosis, or edema  LYMPH: No lymphadenopathy noted  SKIN: No rashes or lesions        LABS:                        12.9   6.29  )-----------( 263      ( 10 Stef 2021 08:16 )             37.8     06-10    136  |  103  |  8   ----------------------------<  154<H>  3.7   |  22  |  1.00    Ca    8.7      10 Stef 2021 08:16  Phos  3.8     06-10  Mg     2.0     06-10          CAPILLARY BLOOD GLUCOSE      POCT Blood Glucose.: 138 mg/dL (10 Stef 2021 12:21)  POCT Blood Glucose.: 148 mg/dL (10 Stef 2021 07:56)  POCT Blood Glucose.: 148 mg/dL (10 Stef 2021 06:45)  POCT Blood Glucose.: 148 mg/dL (09 Jun 2021 21:53)  POCT Blood Glucose.: 148 mg/dL (09 Jun 2021 17:38)            MEDICATIONS  (STANDING):  atorvastatin 80 milliGRAM(s) Oral at bedtime  benzocaine 15 mG/menthol 3.6 mG Lozenge 1 Lozenge Oral three times a day  dextrose 40% Gel 15 Gram(s) Oral once  dextrose 5%. 1000 milliLiter(s) (50 mL/Hr) IV Continuous <Continuous>  dextrose 5%. 1000 milliLiter(s) (100 mL/Hr) IV Continuous <Continuous>  dextrose 50% Injectable 25 Gram(s) IV Push once  dextrose 50% Injectable 12.5 Gram(s) IV Push once  dextrose 50% Injectable 25 Gram(s) IV Push once  emtricitabine 200 mG/tenofovir 300 mG (TRUVADA) 1 Tablet(s) Oral daily  enalapril 2.5 milliGRAM(s) Oral daily  glucagon  Injectable 1 milliGRAM(s) IntraMuscular once  heparin   Injectable 5000 Unit(s) SubCutaneous every 8 hours  insulin lispro (ADMELOG) corrective regimen sliding scale   SubCutaneous Before meals and at bedtime  lactated ringers. 1000 milliLiter(s) (50 mL/Hr) IV Continuous <Continuous>  levothyroxine 50 MICROGram(s) Oral daily  metoprolol tartrate 50 milliGRAM(s) Oral daily  pantoprazole    Tablet 40 milliGRAM(s) Oral before breakfast  potassium chloride  10 mEq/100 mL IVPB 10 milliEquivalent(s) IV Intermittent every 1 hour  valACYclovir 500 milliGRAM(s) Oral daily    MEDICATIONS  (PRN):  acetaminophen   Tablet .. 1000 milliGRAM(s) Oral every 6 hours PRN Temp greater or equal to 38C (100.4F), Moderate Pain (4 - 6)  melatonin 5 milliGRAM(s) Oral at bedtime PRN Insomnia  ondansetron Injectable 4 milliGRAM(s) IV Push once PRN Nausea and/or Vomiting      RADIOLOGY & ADDITIONAL TESTS:    Imaging Personally Reviewed:  [ ] YES  [ ] NO    Consultant(s) Notes Reviewed:  [ ] YES  [ ] NO    Care Discussed with Consultants/Other Providers [ ] YES  [ ] NO

## 2021-06-10 NOTE — PROGRESS NOTE ADULT - ASSESSMENT
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 Presenting with abdominal pain and nausea. Last BF 2 days before admission ago, CT with evidence of SBO now w/ ROBF    FLD/IVF  Valacyclovir oral   Pain/nausea control (avoid narcotics)  OOBA/SCD/IS/SQH  KEHINDE  AM labs   no known allergies

## 2021-06-10 NOTE — DISCHARGE NOTE PROVIDER - NSDCFUADDINST_GEN_ALL_CORE_FT
General Discharge Instructions:  Please resume all regular home medications unless specifically advised not to take a particular medication. Also, please take any new medications as prescribed.    Please get plenty of rest, continue to ambulate several times per day, and drink adequate amounts of fluids. Avoid lifting weights greater than 5-10 lbs until you follow-up with your surgeon, who will instruct you further regarding activity restrictions.    Please follow-up with your surgeon and Primary Care Provider (PCP) as advised.    Continue a full liquid diet for now. As you feel less distended, you can try small amounts of solid food. Advance diet as tolerated. Keep hydrated with plenty of liquids.

## 2021-06-10 NOTE — PROGRESS NOTE ADULT - PROVIDER SPECIALTY LIST ADULT
Surgery
Surgery
Hospitalist
Hospitalist
Surgery
Surgery
Hospitalist
Hospitalist
Surgery
Surgery
Hospitalist
Hospitalist
Surgery

## 2021-06-10 NOTE — DISCHARGE NOTE PROVIDER - CARE PROVIDER_API CALL
Cesar Cruz  SURGERY  [address] [place] [address]  Phone: [phone]  Fax: [phone]  Follow Up Time: 1 week

## 2021-06-10 NOTE — DISCHARGE NOTE PROVIDER - NSDCMRMEDTOKEN_GEN_ALL_CORE_FT
enalapril 2.5 mg oral tablet: 1 tab(s) orally once a day  fenofibrate 150 mg oral capsule: 1 cap(s) orally once a day  levothyroxine 50 mcg (0.05 mg) oral tablet: 1 tab(s) orally once a day  metoprolol tartrate 50 mg oral tablet: 1 tab(s) orally once a day  nevirapine 400 mg oral tablet, extended release: 1 tab(s) orally once a day  omeprazole 40 mg oral delayed release capsule: 1 cap(s) orally once a day  rosuvastatin 40 mg oral tablet: 1 tab(s) orally once a day  Truvada 200 mg-300 mg oral tablet: 1 tab(s) orally once a day  valACYclovir 500 mg oral tablet: 1 tab(s) orally once a day

## 2021-06-10 NOTE — DISCHARGE NOTE PROVIDER - HOSPITAL COURSE
This is a 66 years old male with HIV on antiretroviral medication. 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 Denies any abdominal surgery. Patient reports presenting with sharp and crampy, intermittent abdominal pain for the past 2 days, lasting a few minutes and recurring every 30 to 40 min. Pain non-radiating, associated with mild nausea. Denies vomit, fever, chills, previous similar episode in the past. Last BM and Flatus was 2 days ago when the symptoms initiated.   A CT scan performed before admission is compatible with partial SBO and distal ileal wall thickening infectious in etiology, or reflecting inflammatory bowel disease.  Patient transferred from [facility], admitted to regional floor. Afebrile, HDS, Alert and oriented, in some distress due to the abdominal pain. NGT placed.   Patient managed nonoperatively with NGT decompression. Diet advanced as tolerated after bowel function returned. On day of discharge, patient had stable vital signs, was tolerating diet, voiding without assistance, and pain was controlled. The patient is to follow up in 1-2 weeks.

## 2021-06-14 DIAGNOSIS — Z85.048 PERSONAL HISTORY OF OTHER MALIGNANT NEOPLASM OF RECTUM, RECTOSIGMOID JUNCTION, AND ANUS: ICD-10-CM

## 2021-06-14 DIAGNOSIS — B02.9 ZOSTER WITHOUT COMPLICATIONS: ICD-10-CM

## 2021-06-14 DIAGNOSIS — I25.10 ATHEROSCLEROTIC HEART DISEASE OF NATIVE CORONARY ARTERY WITHOUT ANGINA PECTORIS: ICD-10-CM

## 2021-06-14 DIAGNOSIS — I25.2 OLD MYOCARDIAL INFARCTION: ICD-10-CM

## 2021-06-14 DIAGNOSIS — Z88.2 ALLERGY STATUS TO SULFONAMIDES: ICD-10-CM

## 2021-06-14 DIAGNOSIS — E11.9 TYPE 2 DIABETES MELLITUS WITHOUT COMPLICATIONS: ICD-10-CM

## 2021-06-14 DIAGNOSIS — B20 HUMAN IMMUNODEFICIENCY VIRUS [HIV] DISEASE: ICD-10-CM

## 2021-06-14 DIAGNOSIS — K21.9 GASTRO-ESOPHAGEAL REFLUX DISEASE WITHOUT ESOPHAGITIS: ICD-10-CM

## 2021-06-14 DIAGNOSIS — E03.9 HYPOTHYROIDISM, UNSPECIFIED: ICD-10-CM

## 2021-06-14 DIAGNOSIS — E78.1 PURE HYPERGLYCERIDEMIA: ICD-10-CM

## 2021-06-14 DIAGNOSIS — K56.600 PARTIAL INTESTINAL OBSTRUCTION, UNSPECIFIED AS TO CAUSE: ICD-10-CM

## 2021-06-14 DIAGNOSIS — Z92.21 PERSONAL HISTORY OF ANTINEOPLASTIC CHEMOTHERAPY: ICD-10-CM

## 2021-06-14 DIAGNOSIS — Z92.3 PERSONAL HISTORY OF IRRADIATION: ICD-10-CM

## 2021-06-14 DIAGNOSIS — I10 ESSENTIAL (PRIMARY) HYPERTENSION: ICD-10-CM

## 2021-06-14 DIAGNOSIS — Z95.5 PRESENCE OF CORONARY ANGIOPLASTY IMPLANT AND GRAFT: ICD-10-CM

## 2021-06-14 DIAGNOSIS — Z85.21 PERSONAL HISTORY OF MALIGNANT NEOPLASM OF LARYNX: ICD-10-CM

## 2021-06-14 DIAGNOSIS — G47.9 SLEEP DISORDER, UNSPECIFIED: ICD-10-CM

## 2021-07-29 ENCOUNTER — NON-APPOINTMENT (OUTPATIENT)
Age: 66
End: 2021-07-29

## 2021-07-29 PROBLEM — I10 ESSENTIAL (PRIMARY) HYPERTENSION: Chronic | Status: ACTIVE | Noted: 2021-06-04

## 2021-07-29 PROBLEM — K21.9 GASTRO-ESOPHAGEAL REFLUX DISEASE WITHOUT ESOPHAGITIS: Chronic | Status: ACTIVE | Noted: 2021-06-04

## 2021-07-29 PROBLEM — C32.0 MALIGNANT NEOPLASM OF GLOTTIS: Chronic | Status: ACTIVE | Noted: 2021-06-04

## 2021-07-29 PROBLEM — I21.9 ACUTE MYOCARDIAL INFARCTION, UNSPECIFIED: Chronic | Status: ACTIVE | Noted: 2021-06-04

## 2021-07-29 PROBLEM — Z00.00 ENCOUNTER FOR PREVENTIVE HEALTH EXAMINATION: Status: ACTIVE | Noted: 2021-07-29

## 2021-07-29 PROBLEM — E78.5 HYPERLIPIDEMIA, UNSPECIFIED: Chronic | Status: ACTIVE | Noted: 2021-06-04

## 2021-07-29 PROBLEM — Z85.048 PERSONAL HISTORY OF OTHER MALIGNANT NEOPLASM OF RECTUM, RECTOSIGMOID JUNCTION, AND ANUS: Chronic | Status: ACTIVE | Noted: 2021-06-04

## 2021-07-29 PROBLEM — Z92.3 PERSONAL HISTORY OF IRRADIATION: Chronic | Status: ACTIVE | Noted: 2021-06-04

## 2021-07-29 PROBLEM — B20 HUMAN IMMUNODEFICIENCY VIRUS [HIV] DISEASE: Chronic | Status: ACTIVE | Noted: 2021-06-04

## 2021-07-29 PROBLEM — E11.9 TYPE 2 DIABETES MELLITUS WITHOUT COMPLICATIONS: Chronic | Status: ACTIVE | Noted: 2021-06-04

## 2021-07-30 ENCOUNTER — TRANSCRIPTION ENCOUNTER (OUTPATIENT)
Age: 66
End: 2021-07-30

## 2021-07-30 ENCOUNTER — APPOINTMENT (OUTPATIENT)
Dept: COLORECTAL SURGERY | Facility: CLINIC | Age: 66
End: 2021-07-30
Payer: COMMERCIAL

## 2021-07-30 VITALS
HEART RATE: 49 BPM | DIASTOLIC BLOOD PRESSURE: 68 MMHG | TEMPERATURE: 98.3 F | SYSTOLIC BLOOD PRESSURE: 129 MMHG | WEIGHT: 135 LBS | HEIGHT: 67.5 IN | BODY MASS INDEX: 20.94 KG/M2

## 2021-07-30 DIAGNOSIS — Z87.442 PERSONAL HISTORY OF URINARY CALCULI: ICD-10-CM

## 2021-07-30 DIAGNOSIS — E11.9 TYPE 2 DIABETES MELLITUS W/OUT COMPLICATIONS: ICD-10-CM

## 2021-07-30 DIAGNOSIS — E07.9 DISORDER OF THYROID, UNSPECIFIED: ICD-10-CM

## 2021-07-30 DIAGNOSIS — C21.0 MALIGNANT NEOPLASM OF ANUS, UNSPECIFIED: ICD-10-CM

## 2021-07-30 DIAGNOSIS — B20 HUMAN IMMUNODEFICIENCY VIRUS [HIV] DISEASE: ICD-10-CM

## 2021-07-30 DIAGNOSIS — Z78.9 OTHER SPECIFIED HEALTH STATUS: ICD-10-CM

## 2021-07-30 DIAGNOSIS — Z83.511 FAMILY HISTORY OF GLAUCOMA: ICD-10-CM

## 2021-07-30 DIAGNOSIS — Z82.49 FAMILY HISTORY OF ISCHEMIC HEART DISEASE AND OTHER DISEASES OF THE CIRCULATORY SYSTEM: ICD-10-CM

## 2021-07-30 DIAGNOSIS — N28.9 DISORDER OF KIDNEY AND URETER, UNSPECIFIED: ICD-10-CM

## 2021-07-30 DIAGNOSIS — I73.9 PERIPHERAL VASCULAR DISEASE, UNSPECIFIED: ICD-10-CM

## 2021-07-30 DIAGNOSIS — I25.10 ATHEROSCLEROTIC HEART DISEASE OF NATIVE CORONARY ARTERY W/OUT ANGINA PECTORIS: ICD-10-CM

## 2021-07-30 DIAGNOSIS — K59.09 OTHER CONSTIPATION: ICD-10-CM

## 2021-07-30 DIAGNOSIS — C32.0 MALIGNANT NEOPLASM OF GLOTTIS: ICD-10-CM

## 2021-07-30 PROCEDURE — 99203 OFFICE O/P NEW LOW 30 MIN: CPT

## 2021-07-30 NOTE — HISTORY OF PRESENT ILLNESS
[FreeTextEntry1] : 67 yo M presents w/ partner for evaluation of small bowel obstruction, referred by Dr. 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 Distal ileal wall thickening up to 8.3 mm with mild circumferential contrast enhancement, suggesting distal ileus, either infectious in etiology, or reflecting inflammatory bowel disease.\par 2. There is a partial small bowel obstruction as a result, with mid and distal small bowel loops, distended up to a diameter of 3 cm.\par 3. Mild ascites within the pelvis.\par 4. Normal appendix.\par \par Repeat CT on 6/7:\par IMPRESSION:\par 1. Since Jojo 3, 2021, increased extent of multiple foci of discontinuous high-grade luminal narrowing in terminal and distal ileal segments with slightly increased dilated upstream fluid-filled small bowel loops suggesting obstruction/stricture, consider Crohn's inflammatory bowel disease, infectious enteritis not excluded. Advise gastroenterology consultation.\par 2. Slightly increased small ascites, likely reactive.\par \par XR small bowel 6/8/21:\par IMPRESSION: Since 6/7/2021, findings consistent with worsening high-grade partial small bowel obstruction. Follow-up abdominal x-ray recommended to assess for passage of contrast through remainder of small bowel.\par \par Patient was followed inpatient by Dr. Cesar Cruz and f/u with him two weeks after hospitalization. Pt advised to eat small amounts of food and present to ER if has red flag symptoms. As per patient, has been told in the past that risks of surgery outweigh benefits\par \par Pt c/o constipation x 4-5 days and on Friday w/ abdominal pain, sharp pain in anus and difficulty urinating. Also noted scant amount of BRB mixed w/ the stool. Patient took 3 dulcolax and eventually passed several BMs w/ smaller, looser stools.\par He continues to feel constipated and bloated, denies nausea or vomiting\par Seen by GI Sania yesterday who recommended starting Miralax, he has yet to start Miralax and took 2 Dulcolax yesterday as per advice of PCP w/ passing of small amount of stool today\par \par GI also ordered MRI ?abd/pelvis which is scheduled next Tuesday \par \par h/o explosive diarrhea for the last several years, however as of this year began to have intermittent constipation. PCP suggested stopping possibly aggravating medication which improved diarrhea, however may have worsened constipation at the time. Pt may have had stool testing years ago w/ PCP\par Patient is wearing an adult diaper since recent hospitalization due to FU and occasional FI\par Also c/o fecal streaking in underwear\par \par Pt admits he could improve dietary fiber intake\par Reports adequate water intake\par \par Last EGD/Colonoscopy 4/12/2019 pathology reviewed:\par Duodenal mucosa w/ focal low grade dysplasia/TA, adjacent piece of mucosa WNL displaying preserved villous architecture\par Inactive chronic gastritis, negative for H. pylori\par Ileal mucosa WNL, negative of features or ileitis, granulomata or dysplasia\par Colonic mucosa WNL, negative for features of colitis.\par \par Denies FMH CRC, mother w/ chronic constipation and obstruction (age 80s), s/p resection \par Takes ASA 81 mg daily

## 2021-07-30 NOTE — PHYSICAL EXAM
[Abdomen Masses] : No abdominal masses [Abdomen Tenderness] : ~T No ~M abdominal tenderness [Tight] : was tight [de-identified] : Moderate stenosis of the anal canal. Soft stool. No fecal impaction

## 2021-07-30 NOTE — ASSESSMENT
[FreeTextEntry1] : I reviewed with the patient and his partner the findings on examination and recent hospitalization/CT scan imaging are consistent with a possible ileal stricture. The etiology remains unidentified but potentially related to prior radiation treatment. Potential etiologies include infection, malignancy and secondary causes.\par \par Recommend followup of MRI.\par \par In regard to his anal stenosis I have recommended a more aggressive efforts to improve his bowel habits with laxatives. I reinforced the need for daily MiraLax.\par \par \par He will follow up in one week after MRI for further treatment recommendations

## 2021-10-14 ENCOUNTER — RESULT REVIEW (OUTPATIENT)
Age: 66
End: 2021-10-14

## 2021-10-14 ENCOUNTER — OUTPATIENT (OUTPATIENT)
Dept: OUTPATIENT SERVICES | Facility: HOSPITAL | Age: 66
LOS: 1 days | End: 2021-10-14

## 2021-10-14 ENCOUNTER — APPOINTMENT (OUTPATIENT)
Dept: MRI IMAGING | Facility: CLINIC | Age: 66
End: 2021-10-14
Payer: COMMERCIAL

## 2021-10-14 PROCEDURE — 72197 MRI PELVIS W/O & W/DYE: CPT | Mod: 26

## 2021-10-14 PROCEDURE — 74183 MRI ABD W/O CNTR FLWD CNTR: CPT | Mod: 26

## 2022-01-21 ENCOUNTER — NON-APPOINTMENT (OUTPATIENT)
Age: 67
End: 2022-01-21

## 2022-01-24 ENCOUNTER — NON-APPOINTMENT (OUTPATIENT)
Age: 67
End: 2022-01-24

## 2022-01-28 ENCOUNTER — APPOINTMENT (OUTPATIENT)
Dept: COLORECTAL SURGERY | Facility: CLINIC | Age: 67
End: 2022-01-28
Payer: COMMERCIAL

## 2022-01-28 VITALS
HEIGHT: 67.5 IN | BODY MASS INDEX: 22.34 KG/M2 | TEMPERATURE: 98.3 F | WEIGHT: 144 LBS | DIASTOLIC BLOOD PRESSURE: 74 MMHG | SYSTOLIC BLOOD PRESSURE: 130 MMHG | HEART RATE: 56 BPM

## 2022-01-28 DIAGNOSIS — K56.609 UNSPECIFIED INTESTINAL OBSTRUCTION, UNSPECIFIED AS TO PARTIAL VERSUS COMPLETE OBSTRUCTION: ICD-10-CM

## 2022-01-28 PROCEDURE — 99214 OFFICE O/P EST MOD 30 MIN: CPT

## 2022-01-28 NOTE — ASSESSMENT
[FreeTextEntry1] : I reviewed with the patient the findings of his recent CT enterography are consistent with terminal ileal obstruction.  The etiology of this process is unclear.  I have outlined to the patient the possibility of underlying Crohn's disease versus secondary etiologies including radiation-induced enteritis.  I have discussed with him the need for further evaluation including colonoscopy with terminal ileal intubation.  He will see his GI for colonoscopy.  Pending review of the colonoscopy results further treatment may include medical therapy including possible steroids and/or secondary medications for underlying Crohn's disease versus consideration of resection if this is considered a radiation-induced injury.  The patient understands the treatment plan.  All questions answered.  Risk, benefits alternatives outlined.

## 2022-01-28 NOTE — HISTORY OF PRESENT ILLNESS
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 On exam, moderate stenosis of the anal canal noted. Advised to begin using Miralax daily and MRI enterography ordered. \par \par MR enterography completed 10/20/21\par - Unremarkable MR enterography\par \par Patient advised to f/u with Dr. Lui, GI, for continued management\par \par Patient called in to the office on 1/21/22 with c/o possible recurrent SBO. Spoke to DIGNA Pena and c/o mild abdominal bloating for several days and constipation for 1.5 days. On 1/20/22, patient ate steak and potatoes for dinner. Almost immediately, he began to notice his abdomen become distended with pains and "gurgling". Patient took a dosage of Miralax and at 4 am on 1/21/22 was able to pass a small amount of stool and gas. This continued over the course of 1-2 hours. When he called in on 1/22/22, he c/o persistent tenderness of the abdomen and bloating with sensation of incomplete evacuation of the bowels. Patient was advised to take 1 bottle of Magnesium Citrate and start a liquid diet. He was also advised to take 1 capful of Miralax nightly. Order for CT enterography placed\par \par DIGNA Pena spoke to patient again on 1/24/22. Patient followed liquid to low residue diet over the weekend and was taking Miralax as directed with improvement in abdominal pains but no resolution of gurgling that was keeping him and his partner up at night. Diet advanced to low residue diet and Miralax decreased to 1/2 capful daily. Auth for CT enterography obtained for completion at [facility], patient opted to schedule himself\par \par CT Enterography completed 1/27/22\par - there is ~ 10-12 cm of wall thickening involving the distal and terminal ileum. There is apparent ulceration of the mucosa in this region. The findings are suggestive of Crohn's Disease. There is slight dilation of the small bowel immediately proximal to this region\par - ~ 25 cm proximal to T.I.,  there is a persistent ~ 1.5 cm in length region of asymmetric wall thickening, with suggestion of ulceration identified within the small bowel. \par - H/o reported carcinoma of the anus. This region is suboptimally evaluated on this exam\par - there are no masses lesions or significant lymphadenopathy identified within the abdomen and pelvis. There is no evidence of metastatic disease\par - gallbladder is mildly distended, however, within normal limits. CT fails to appreciate evidence of radiopaque calculi \par \par Since we last followed up over the phone, he has been doing well, continues to have bowel function with flatus and occasionally passing liquid stools alternating with solid stool. Does have some fecal urgency with it however he states he has had this is issue since he was treated for anal cancer.\par \par Is now back to low residue foods with no nausea/vomiting. Chronically has reflux and takes Pepcid at home and says it may be a little worse lately. Has not had drastic changes in weight. Also notes noticeable borborygmi lately. Taking 1/2 capful of Miralax daily. Continues to follow a low residue diet\par \par Last EGD/Colonoscopy 4/12/19

## 2022-01-28 NOTE — REVIEW OF SYSTEMS
[Constipation] : constipation [Diarrhea] : diarrhea [Negative] : Neurological [Abdominal Pain] : no abdominal pain [Vomiting] : no vomiting [Heartburn] : no heartburn [Melena] : no melena

## 2022-01-28 NOTE — PHYSICAL EXAM
[Exam Deferred] : exam was deferred [Normal Rate and Rhythm] : normal rate and rhythm [No Edema] : No edema [Alert] : alert [Oriented to Person] : oriented to person [Oriented to Place] : oriented to place [Oriented to Time] : oriented to time [Calm] : calm [Abdomen Masses] : No abdominal masses [Abdomen Tenderness] : ~T No ~M abdominal tenderness [No HSM] : no hepatosplenomegaly [de-identified] : soft, mildly distended, non-tender [de-identified] : male of stated age in NAD [de-identified] : EOMI, no scleral icterus [de-identified] : supple, no JVD [de-identified] : no wheezing, normal respiratory effort on RA [de-identified] : no gross deformity, no gait abnormality

## 2022-02-25 ENCOUNTER — APPOINTMENT (OUTPATIENT)
Dept: COLORECTAL SURGERY | Facility: CLINIC | Age: 67
End: 2022-02-25
Payer: COMMERCIAL

## 2022-02-25 VITALS
SYSTOLIC BLOOD PRESSURE: 136 MMHG | DIASTOLIC BLOOD PRESSURE: 76 MMHG | WEIGHT: 145 LBS | TEMPERATURE: 98.1 F | HEART RATE: 52 BPM | BODY MASS INDEX: 22.49 KG/M2 | HEIGHT: 67.5 IN

## 2022-02-25 DIAGNOSIS — K56.609 UNSPECIFIED INTESTINAL OBSTRUCTION, UNSPECIFIED AS TO PARTIAL VERSUS COMPLETE OBSTRUCTION: ICD-10-CM

## 2022-02-25 PROCEDURE — 99214 OFFICE O/P EST MOD 30 MIN: CPT

## 2022-02-25 NOTE — ASSESSMENT
[FreeTextEntry1] : I had extensive discussion with the patient–regarding the clinical findings and diagnosis of his recurrent small intestinal obstruction.  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  I have advised him of the surgical and nonsurgical approaches to treatment and management of this condition including laparoscopic-assisted intestinal resection versus conservative treatment with low residue diet.\par I have outlined to him that he has slight dilation at this time proximal to the obstruction and a repeat CT enterography would be appropriate in 6 months to further evaluate potential risk of progression of this obstruction.\par \par Overall the patient is wishing to proceed with conservative nonsurgical management with dietary changes and low residue diet.  However, if his symptoms change will be in prompt contact regarding management.\par \par The patient will return in 6 months for repeat examination and assessment with imaging.  All questions answered

## 2022-02-25 NOTE — PHYSICAL EXAM
[Abdomen Masses] : No abdominal masses [Abdomen Tenderness] : ~T No ~M abdominal tenderness [No HSM] : no hepatosplenomegaly [Exam Deferred] : exam was deferred [Normal Rate and Rhythm] : normal rate and rhythm [No Edema] : No edema [Alert] : alert [Oriented to Person] : oriented to person [Oriented to Place] : oriented to place [Oriented to Time] : oriented to time [Calm] : calm [de-identified] : soft,  [de-identified] : male of stated age in NAD [de-identified] : EOMI, no scleral icterus [de-identified] : supple, no JVD [de-identified] : no wheezing, normal respiratory effort on RA [de-identified] : no gross deformity, no gait abnormality

## 2022-02-25 NOTE — HISTORY OF PRESENT ILLNESS
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 There is apparent ulceration of the mucosa in this region. The findings are suggestive of Crohn's Disease. There is slight dilation of the small bowel immediately proximal to this region\par ~ 25 cm proximal to T.I., there is a persistent ~ 1.5 cm in length region of asymmetric wall thickening, with suggestion of ulceration identified within the small bowel. 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 This region is suboptimally evaluated on this exam\par There are no masses lesions or significant lymphadenopathy identified within the abdomen and pelvis. There is no evidence of metastatic disease\par Gallbladder is mildly distended, however, within normal limits. CT fails to appreciate evidence of radiopaque calculi \par \par Last seen in the office 1/28/22 and patient reported improved symptoms: Reviewed CT enterography c/w terminal ileal obstruction, recommendations for colonoscopy with terminal ileal intubation proposed as course of treatment. Pt to f/u w/ GI. Differential diagnosis including Crohn's disease vs radiation- induce injury and discussed possible consideration of resection of this area if radiation induce injury. \par \par Colonoscopy completed w/ GI [facility] 2/11/22:\par Patchy area of mucosa in the terminal ileum was erythematous\par Localized area of mildly erythematous mucosa found in the descending colon\par Biopsies for histology taken with cold forceps from cecum, ascending colon, transverse colon, descending colon, sigmoid colon and rectum for evaluation of microscopic colitis. \par Two flat polyps were found in the descending colon measuring 4 to 8 mm in size, resected and retrieved \par The perianal exam findings include hypertrophied anal papillae. \par \par A. Ileal mucosa w/ patchy, mild acute and chronic, non-specific ileitis.\par B. Two TA fragments\par C. Colonic mucosa w/ patchy, minimally active, non-specific colitis. Favor radiation injury over IBD\par Pt reports is doing well and denies having obstructive symptoms since last visit. He continues 1/2 capful Miralax most nights which he tolerates and denies having diarrhea or abdominal cramping\par \par BH: once daily to every other day, soft/formed\par Currently following low residue diet including cooked vegetables\par Drinks 3 glasses water daily, trying to limit caffeine\par \par Denies FMH of colorectal cancer. Mother w/ chronic constipation and obstruction aged 80s, s/p resection\par Typically takes ASA 81 mg, however stopped due to recent colonoscopy

## 2022-02-25 NOTE — REASON FOR VISIT
Patient tolerated procedure well  Steri strips on right leg dry and intact  Discharge instructions completed with patient and spouse  Two week follow up appointment made with PA  Compression stocking applied.   Patient discharged without any issues.    [Follow-Up: _____] : a [unfilled] follow-up visit

## 2022-09-23 ENCOUNTER — APPOINTMENT (OUTPATIENT)
Dept: CT IMAGING | Facility: CLINIC | Age: 67
End: 2022-09-23

## 2022-09-23 ENCOUNTER — RESULT REVIEW (OUTPATIENT)
Age: 67
End: 2022-09-23

## 2022-09-23 ENCOUNTER — OUTPATIENT (OUTPATIENT)
Dept: OUTPATIENT SERVICES | Facility: HOSPITAL | Age: 67
LOS: 1 days | End: 2022-09-23

## 2022-09-23 PROCEDURE — 74177 CT ABD & PELVIS W/CONTRAST: CPT | Mod: 26

## 2022-10-07 ENCOUNTER — NON-APPOINTMENT (OUTPATIENT)
Age: 67
End: 2022-10-07